# Patient Record
Sex: FEMALE | Race: WHITE | NOT HISPANIC OR LATINO | Employment: FULL TIME | URBAN - METROPOLITAN AREA
[De-identification: names, ages, dates, MRNs, and addresses within clinical notes are randomized per-mention and may not be internally consistent; named-entity substitution may affect disease eponyms.]

---

## 2023-03-23 LAB
ALANINE AMINOTRANSFERASE (SGPT) (U/L) IN SER/PLAS: 18 U/L (ref 7–45)
ALBUMIN (G/DL) IN SER/PLAS: 4.2 G/DL (ref 3.4–5)
ALKALINE PHOSPHATASE (U/L) IN SER/PLAS: 74 U/L (ref 33–136)
ANION GAP IN SER/PLAS: 15 MMOL/L (ref 10–20)
APPEARANCE, URINE: ABNORMAL
ASPARTATE AMINOTRANSFERASE (SGOT) (U/L) IN SER/PLAS: 14 U/L (ref 9–39)
BACTERIA, URINE: ABNORMAL /HPF
BILIRUBIN TOTAL (MG/DL) IN SER/PLAS: 1.1 MG/DL (ref 0–1.2)
BILIRUBIN, URINE: NEGATIVE
BLOOD, URINE: NEGATIVE
CALCIUM (MG/DL) IN SER/PLAS: 10.2 MG/DL (ref 8.6–10.6)
CARBON DIOXIDE, TOTAL (MMOL/L) IN SER/PLAS: 25 MMOL/L (ref 21–32)
CHLORIDE (MMOL/L) IN SER/PLAS: 102 MMOL/L (ref 98–107)
CHOLESTEROL (MG/DL) IN SER/PLAS: 225 MG/DL (ref 0–199)
CHOLESTEROL IN HDL (MG/DL) IN SER/PLAS: 42.9 MG/DL
CHOLESTEROL/HDL RATIO: 5.2
COLOR, URINE: YELLOW
CREATININE (MG/DL) IN SER/PLAS: 1.1 MG/DL (ref 0.5–1.05)
ERYTHROCYTE DISTRIBUTION WIDTH (RATIO) BY AUTOMATED COUNT: 13.2 % (ref 11.5–14.5)
ERYTHROCYTE MEAN CORPUSCULAR HEMOGLOBIN CONCENTRATION (G/DL) BY AUTOMATED: 30.9 G/DL (ref 32–36)
ERYTHROCYTE MEAN CORPUSCULAR VOLUME (FL) BY AUTOMATED COUNT: 90 FL (ref 80–100)
ERYTHROCYTES (10*6/UL) IN BLOOD BY AUTOMATED COUNT: 4.64 X10E12/L (ref 4–5.2)
ESTIMATED AVERAGE GLUCOSE FOR HBA1C: 151 MG/DL
GFR FEMALE: 56 ML/MIN/1.73M2
GLUCOSE (MG/DL) IN SER/PLAS: 154 MG/DL (ref 74–99)
GLUCOSE, URINE: NEGATIVE MG/DL
HEMATOCRIT (%) IN BLOOD BY AUTOMATED COUNT: 41.7 % (ref 36–46)
HEMOGLOBIN (G/DL) IN BLOOD: 12.9 G/DL (ref 12–16)
HEMOGLOBIN A1C/HEMOGLOBIN TOTAL IN BLOOD: 6.9 %
KETONES, URINE: NEGATIVE MG/DL
LDL: 130 MG/DL (ref 0–99)
LEUKOCYTE ESTERASE, URINE: ABNORMAL
LEUKOCYTES (10*3/UL) IN BLOOD BY AUTOMATED COUNT: 8.1 X10E9/L (ref 4.4–11.3)
MUCUS, URINE: ABNORMAL /LPF
NITRITE, URINE: NEGATIVE
NON HDL CHOLESTEROL: 182 MG/DL
NRBC (PER 100 WBCS) BY AUTOMATED COUNT: 0 /100 WBC (ref 0–0)
PH, URINE: 5 (ref 5–8)
PLATELETS (10*3/UL) IN BLOOD AUTOMATED COUNT: 303 X10E9/L (ref 150–450)
POTASSIUM (MMOL/L) IN SER/PLAS: 4.9 MMOL/L (ref 3.5–5.3)
PROTEIN TOTAL: 7.1 G/DL (ref 6.4–8.2)
PROTEIN, URINE: NEGATIVE MG/DL
RBC, URINE: ABNORMAL /HPF (ref 0–5)
SODIUM (MMOL/L) IN SER/PLAS: 137 MMOL/L (ref 136–145)
SPECIFIC GRAVITY, URINE: 1.02 (ref 1–1.03)
SQUAMOUS EPITHELIAL CELLS, URINE: 4 /HPF
TRIGLYCERIDE (MG/DL) IN SER/PLAS: 263 MG/DL (ref 0–149)
TROPONIN I, HIGH SENSITIVITY: 3 NG/L (ref 0–34)
UREA NITROGEN (MG/DL) IN SER/PLAS: 23 MG/DL (ref 6–23)
UROBILINOGEN, URINE: <2 MG/DL (ref 0–1.9)
VLDL: 53 MG/DL (ref 0–40)
WBC, URINE: 9 /HPF (ref 0–5)

## 2023-03-24 DIAGNOSIS — I10 HYPERTENSION, UNSPECIFIED TYPE: ICD-10-CM

## 2023-03-24 DIAGNOSIS — E78.5 HYPERLIPIDEMIA, UNSPECIFIED HYPERLIPIDEMIA TYPE: ICD-10-CM

## 2023-03-24 DIAGNOSIS — E13.69 OTHER SPECIFIED DIABETES MELLITUS WITH OTHER SPECIFIED COMPLICATION, UNSPECIFIED WHETHER LONG TERM INSULIN USE (MULTI): ICD-10-CM

## 2023-03-24 DIAGNOSIS — E03.9 HYPOTHYROIDISM, UNSPECIFIED TYPE: ICD-10-CM

## 2023-03-24 RX ORDER — SITAGLIPTIN AND METFORMIN HYDROCHLORIDE 1000; 50 MG/1; MG/1
1 TABLET, FILM COATED, EXTENDED RELEASE ORAL 2 TIMES DAILY
COMMUNITY
Start: 2022-03-25 | End: 2023-04-04 | Stop reason: SDUPTHER

## 2023-03-24 RX ORDER — LOSARTAN POTASSIUM 50 MG/1
1 TABLET ORAL DAILY
COMMUNITY
Start: 2022-03-09 | End: 2023-04-04 | Stop reason: SDUPTHER

## 2023-03-24 RX ORDER — AMLODIPINE AND BENAZEPRIL HYDROCHLORIDE 10; 40 MG/1; MG/1
1 CAPSULE ORAL DAILY
COMMUNITY
Start: 2016-10-03 | End: 2023-04-04 | Stop reason: SDUPTHER

## 2023-03-24 RX ORDER — LEVOTHYROXINE SODIUM 50 UG/1
1 TABLET ORAL DAILY
COMMUNITY
Start: 2012-06-22 | End: 2023-04-04 | Stop reason: SDUPTHER

## 2023-03-24 RX ORDER — ATORVASTATIN CALCIUM 10 MG/1
1 TABLET, FILM COATED ORAL DAILY
COMMUNITY
Start: 2022-08-30 | End: 2023-04-04 | Stop reason: SDUPTHER

## 2023-03-26 RX ORDER — SITAGLIPTIN AND METFORMIN HYDROCHLORIDE 1000; 50 MG/1; MG/1
1 TABLET, FILM COATED, EXTENDED RELEASE ORAL 2 TIMES DAILY
Qty: 20 TABLET | Refills: 0 | OUTPATIENT
Start: 2023-03-26 | End: 2023-04-05

## 2023-03-26 RX ORDER — LOSARTAN POTASSIUM 50 MG/1
50 TABLET ORAL DAILY
Qty: 10 TABLET | Refills: 0 | OUTPATIENT
Start: 2023-03-26 | End: 2023-04-05

## 2023-03-26 RX ORDER — LEVOTHYROXINE SODIUM 50 UG/1
50 TABLET ORAL DAILY
Qty: 10 TABLET | Refills: 0 | OUTPATIENT
Start: 2023-03-26 | End: 2023-04-05

## 2023-03-26 RX ORDER — AMLODIPINE AND BENAZEPRIL HYDROCHLORIDE 10; 40 MG/1; MG/1
1 CAPSULE ORAL DAILY
Qty: 10 CAPSULE | Refills: 0 | OUTPATIENT
Start: 2023-03-26 | End: 2023-04-05

## 2023-03-26 RX ORDER — ATORVASTATIN CALCIUM 10 MG/1
10 TABLET, FILM COATED ORAL DAILY
Qty: 10 TABLET | Refills: 0 | OUTPATIENT
Start: 2023-03-26 | End: 2023-04-05

## 2023-03-30 PROBLEM — R05.9 COUGH: Status: ACTIVE | Noted: 2023-03-30

## 2023-03-30 PROBLEM — E03.9 HYPOTHYROIDISM: Status: ACTIVE | Noted: 2023-03-30

## 2023-03-30 PROBLEM — H25.13 AGE-RELATED NUCLEAR CATARACT OF BOTH EYES: Status: ACTIVE | Noted: 2023-03-30

## 2023-03-30 PROBLEM — G47.00 INSOMNIA: Status: ACTIVE | Noted: 2023-03-30

## 2023-03-30 PROBLEM — M54.50 LOWER BACK PAIN: Status: ACTIVE | Noted: 2023-03-30

## 2023-03-30 PROBLEM — E55.9 VITAMIN D DEFICIENCY: Status: ACTIVE | Noted: 2023-03-30

## 2023-03-30 PROBLEM — H52.4 BILATERAL PRESBYOPIA: Status: ACTIVE | Noted: 2023-03-30

## 2023-03-30 PROBLEM — E78.5 HYPERLIPIDEMIA: Status: ACTIVE | Noted: 2023-03-30

## 2023-03-30 PROBLEM — I10 HYPERTENSION: Status: ACTIVE | Noted: 2023-03-30

## 2023-03-30 PROBLEM — H52.31 ANISOMETROPIA: Status: ACTIVE | Noted: 2023-03-30

## 2023-03-30 PROBLEM — N39.0 UTI (URINARY TRACT INFECTION): Status: ACTIVE | Noted: 2023-03-30

## 2023-03-30 PROBLEM — R53.83 FATIGUE: Status: ACTIVE | Noted: 2023-03-30

## 2023-03-30 PROBLEM — E11.9 DIABETES MELLITUS (MULTI): Status: ACTIVE | Noted: 2023-03-30

## 2023-03-30 PROBLEM — J01.90 ACUTE SINUSITIS: Status: ACTIVE | Noted: 2023-03-30

## 2023-03-30 PROBLEM — M25.569 JOINT PAIN, KNEE: Status: ACTIVE | Noted: 2023-03-30

## 2023-03-30 RX ORDER — ACETAMINOPHEN 500 MG
1 TABLET ORAL DAILY
COMMUNITY
Start: 2022-03-09 | End: 2023-04-04 | Stop reason: SDUPTHER

## 2023-04-04 ENCOUNTER — OFFICE VISIT (OUTPATIENT)
Dept: PRIMARY CARE | Facility: CLINIC | Age: 63
End: 2023-04-04
Payer: COMMERCIAL

## 2023-04-04 VITALS
WEIGHT: 293 LBS | BODY MASS INDEX: 45.99 KG/M2 | HEIGHT: 67 IN | DIASTOLIC BLOOD PRESSURE: 84 MMHG | SYSTOLIC BLOOD PRESSURE: 142 MMHG

## 2023-04-04 DIAGNOSIS — E78.5 HYPERLIPIDEMIA, UNSPECIFIED HYPERLIPIDEMIA TYPE: ICD-10-CM

## 2023-04-04 DIAGNOSIS — E13.9 OTHER SPECIFIED DIABETES MELLITUS WITHOUT COMPLICATION, WITHOUT LONG-TERM CURRENT USE OF INSULIN (MULTI): ICD-10-CM

## 2023-04-04 DIAGNOSIS — N39.0 URINARY TRACT INFECTION WITHOUT HEMATURIA, SITE UNSPECIFIED: ICD-10-CM

## 2023-04-04 DIAGNOSIS — I10 HYPERTENSION, UNSPECIFIED TYPE: ICD-10-CM

## 2023-04-04 DIAGNOSIS — E13.9 OTHER SPECIFIED DIABETES MELLITUS WITHOUT COMPLICATION, WITHOUT LONG-TERM CURRENT USE OF INSULIN (MULTI): Primary | ICD-10-CM

## 2023-04-04 DIAGNOSIS — E03.9 HYPOTHYROIDISM, UNSPECIFIED TYPE: ICD-10-CM

## 2023-04-04 DIAGNOSIS — K80.63 GALLBLADDER & BILE DUCT STONE, ACUTE CHOLECYSTITIS AND OBSTRUCTION: Primary | ICD-10-CM

## 2023-04-04 PROCEDURE — 3077F SYST BP >= 140 MM HG: CPT | Performed by: INTERNAL MEDICINE

## 2023-04-04 PROCEDURE — 3044F HG A1C LEVEL LT 7.0%: CPT | Performed by: INTERNAL MEDICINE

## 2023-04-04 PROCEDURE — 3079F DIAST BP 80-89 MM HG: CPT | Performed by: INTERNAL MEDICINE

## 2023-04-04 PROCEDURE — 4010F ACE/ARB THERAPY RXD/TAKEN: CPT | Performed by: INTERNAL MEDICINE

## 2023-04-04 PROCEDURE — 99213 OFFICE O/P EST LOW 20 MIN: CPT | Performed by: INTERNAL MEDICINE

## 2023-04-04 RX ORDER — SITAGLIPTIN AND METFORMIN HYDROCHLORIDE 1000; 50 MG/1; MG/1
1 TABLET, FILM COATED, EXTENDED RELEASE ORAL 2 TIMES DAILY
Qty: 30 TABLET | Refills: 0 | Status: SHIPPED | OUTPATIENT
Start: 2023-04-04 | End: 2023-04-06 | Stop reason: SDUPTHER

## 2023-04-04 RX ORDER — CIPROFLOXACIN 500 MG/1
500 TABLET ORAL 2 TIMES DAILY
Qty: 20 TABLET | Refills: 0 | Status: SHIPPED | OUTPATIENT
Start: 2023-04-04 | End: 2023-04-04 | Stop reason: SDUPTHER

## 2023-04-04 RX ORDER — AMLODIPINE AND BENAZEPRIL HYDROCHLORIDE 10; 40 MG/1; MG/1
1 CAPSULE ORAL DAILY
Qty: 60 CAPSULE | Refills: 0 | Status: SHIPPED | OUTPATIENT
Start: 2023-04-04 | End: 2023-04-06 | Stop reason: SDUPTHER

## 2023-04-04 RX ORDER — CIPROFLOXACIN 500 MG/1
500 TABLET ORAL 2 TIMES DAILY
Qty: 20 TABLET | Refills: 0 | Status: SHIPPED | OUTPATIENT
Start: 2023-04-04 | End: 2023-04-06 | Stop reason: SDUPTHER

## 2023-04-04 RX ORDER — LEVOTHYROXINE SODIUM 50 UG/1
50 TABLET ORAL DAILY
Qty: 90 TABLET | Refills: 1 | Status: SHIPPED | OUTPATIENT
Start: 2023-04-04 | End: 2023-04-04 | Stop reason: SDUPTHER

## 2023-04-04 RX ORDER — ATORVASTATIN CALCIUM 10 MG/1
10 TABLET, FILM COATED ORAL DAILY
Qty: 90 TABLET | Refills: 1 | Status: SHIPPED | OUTPATIENT
Start: 2023-04-04 | End: 2023-04-04 | Stop reason: SDUPTHER

## 2023-04-04 RX ORDER — LOSARTAN POTASSIUM 50 MG/1
50 TABLET ORAL DAILY
Qty: 60 TABLET | Refills: 0 | Status: SHIPPED | OUTPATIENT
Start: 2023-04-04 | End: 2023-04-06 | Stop reason: SDUPTHER

## 2023-04-04 RX ORDER — LEVOTHYROXINE SODIUM 50 UG/1
50 TABLET ORAL DAILY
Qty: 30 TABLET | Refills: 0 | Status: SHIPPED | OUTPATIENT
Start: 2023-04-04 | End: 2023-04-06 | Stop reason: SDUPTHER

## 2023-04-04 RX ORDER — SITAGLIPTIN AND METFORMIN HYDROCHLORIDE 1000; 50 MG/1; MG/1
1 TABLET, FILM COATED, EXTENDED RELEASE ORAL 2 TIMES DAILY
Qty: 90 TABLET | Refills: 1 | Status: SHIPPED | OUTPATIENT
Start: 2023-04-04 | End: 2023-04-04 | Stop reason: SDUPTHER

## 2023-04-04 RX ORDER — LEVOTHYROXINE SODIUM 50 UG/1
50 TABLET ORAL DAILY
Qty: 30 TABLET | Refills: 0 | Status: SHIPPED | OUTPATIENT
Start: 2023-04-04 | End: 2023-04-04 | Stop reason: SDUPTHER

## 2023-04-04 RX ORDER — AMLODIPINE AND BENAZEPRIL HYDROCHLORIDE 10; 40 MG/1; MG/1
1 CAPSULE ORAL DAILY
Qty: 60 CAPSULE | Refills: 0 | Status: SHIPPED | OUTPATIENT
Start: 2023-04-04 | End: 2023-04-04 | Stop reason: SDUPTHER

## 2023-04-04 RX ORDER — ATORVASTATIN CALCIUM 10 MG/1
10 TABLET, FILM COATED ORAL DAILY
Qty: 30 TABLET | Refills: 0 | Status: SHIPPED | OUTPATIENT
Start: 2023-04-04 | End: 2023-04-04 | Stop reason: SDUPTHER

## 2023-04-04 RX ORDER — LOSARTAN POTASSIUM 50 MG/1
50 TABLET ORAL DAILY
Qty: 60 TABLET | Refills: 0 | Status: SHIPPED | OUTPATIENT
Start: 2023-04-04 | End: 2023-04-04 | Stop reason: SDUPTHER

## 2023-04-04 RX ORDER — SITAGLIPTIN AND METFORMIN HYDROCHLORIDE 1000; 50 MG/1; MG/1
1 TABLET, FILM COATED, EXTENDED RELEASE ORAL 2 TIMES DAILY
Qty: 30 TABLET | Refills: 0 | Status: SHIPPED | OUTPATIENT
Start: 2023-04-04 | End: 2023-04-04 | Stop reason: SDUPTHER

## 2023-04-04 RX ORDER — LOSARTAN POTASSIUM 50 MG/1
50 TABLET ORAL DAILY
Qty: 90 TABLET | Refills: 1 | Status: SHIPPED | OUTPATIENT
Start: 2023-04-04 | End: 2023-04-04 | Stop reason: SDUPTHER

## 2023-04-04 RX ORDER — ACETAMINOPHEN 500 MG
5000 TABLET ORAL DAILY
Qty: 90 TABLET | Refills: 0 | Status: SHIPPED | OUTPATIENT
Start: 2023-04-04

## 2023-04-04 RX ORDER — AMLODIPINE AND BENAZEPRIL HYDROCHLORIDE 10; 40 MG/1; MG/1
1 CAPSULE ORAL DAILY
Qty: 90 CAPSULE | Refills: 1 | Status: SHIPPED | OUTPATIENT
Start: 2023-04-04 | End: 2023-04-04 | Stop reason: SDUPTHER

## 2023-04-04 RX ORDER — ATORVASTATIN CALCIUM 10 MG/1
10 TABLET, FILM COATED ORAL DAILY
Qty: 30 TABLET | Refills: 0 | Status: SHIPPED | OUTPATIENT
Start: 2023-04-04 | End: 2023-04-06 | Stop reason: SDUPTHER

## 2023-04-04 NOTE — PROGRESS NOTES
OFFICE NOTE    NAME OF THE PATIENT: Britt Vega    YOB: 1960    CHIEF COMPLAINT:  Ms. Shahid Whitley today came here for multiple medical issues.  She is very unhappy because her blood pressure medication not called in and she is without the blood pressure medicine.  She came here for follow-up on various conditions.  Increased frequency of urine, UTI symptoms.  She came here for follow-up on various conditions.    PAST MEDICAL HISTORY:  Reviewed on EMR, unchanged.    CURRENT MEDICATIONS:  Reviewed on EMR, unchanged.  List reviewed.    ALLERGIES:  Reviewed on EMR, unchanged.    SOCIAL HISTORY:  Reviewed on EMR, unchanged.  She does not smoke and does not drink alcohol.     FAMILY HISTORY:  Reviewed on EMR, unchanged.    REVIEW OF SYSTEMS:  All 12 systems reviewed and pertaining covered in history and physical.    PHYSICAL EXAMINATION  VITAL SIGNS:  As recorded and reviewed from EMR.  RESPIRATORY:  The patient had normal inspirations and expirations.  The breath sounds were equal bilaterally and clear to auscultation.  CARDIOVASCULAR:  The patient had S1 normal, split S2 without obvious rubs, clicks, or murmurs.    GASTROINTESTINAL:  There was no hepatosplenomegaly.  There were no palpable masses and no inguinal nodes.  EXTREMITIES:  Legs had no edema.  NEUROLOGIC:  The patient had normal cranial nerves.  The reflexes, sensory, and motor examination were grossly within normal limits.    LAB WORK:  Laboratory testing discussed.    ASSESSMENT AND PLAN:  Increased frequency of urine, UTI.  I gave her Cipro, it should work.  Type 2 diabetes.  Hemoglobin A1c is very nice.  Hypertension, excellent.  Cholesterol, excellent.  Weight.  She is keeping it under control.  Gynecological, okay.  Follow up in three to four months.  I urged her to call me in any time if necessary.  Continue medication.      Kindly review this note in conjunction with EMR.   Subjective   Patient ID: Britt Whitley is a 62 y.o.  "female who presents for Follow-up.      HPI    Review of Systems    Objective   /84   Ht 1.702 m (5' 7\")   Wt (!) 149 kg (329 lb)   BMI 51.53 kg/m²       Physical Exam    Assessment/Plan   Problem List Items Addressed This Visit          Circulatory    Hypertension    Relevant Medications    amLODIPine-benazepriL (Lotrel) 10-40 mg capsule    losartan (Cozaar) 50 mg tablet       Endocrine/Metabolic    Diabetes mellitus (CMS/HCC)    Relevant Medications    SITagliptin phos-metformin (Janumet XR) 50-1,000 mg tablet, ER multiphase 24 hr    Hypothyroidism    Relevant Medications    levothyroxine (Synthroid, Levoxyl) 50 mcg tablet       Other    Hyperlipidemia    Relevant Medications    atorvastatin (Lipitor) 10 mg tablet         "

## 2023-04-06 RX ORDER — AMLODIPINE AND BENAZEPRIL HYDROCHLORIDE 10; 40 MG/1; MG/1
1 CAPSULE ORAL DAILY
Qty: 60 CAPSULE | Refills: 0 | Status: SHIPPED | OUTPATIENT
Start: 2023-04-06 | End: 2023-04-13 | Stop reason: SDUPTHER

## 2023-04-06 RX ORDER — LOSARTAN POTASSIUM 50 MG/1
50 TABLET ORAL DAILY
Qty: 60 TABLET | Refills: 0 | Status: SHIPPED | OUTPATIENT
Start: 2023-04-06 | End: 2023-04-13 | Stop reason: SDUPTHER

## 2023-04-06 RX ORDER — LEVOTHYROXINE SODIUM 50 UG/1
50 TABLET ORAL DAILY
Qty: 30 TABLET | Refills: 0 | Status: SHIPPED | OUTPATIENT
Start: 2023-04-06 | End: 2023-04-13 | Stop reason: SDUPTHER

## 2023-04-06 RX ORDER — CIPROFLOXACIN 500 MG/1
500 TABLET ORAL 2 TIMES DAILY
Qty: 20 TABLET | Refills: 0 | Status: SHIPPED | OUTPATIENT
Start: 2023-04-06 | End: 2023-04-16

## 2023-04-06 RX ORDER — SITAGLIPTIN AND METFORMIN HYDROCHLORIDE 1000; 50 MG/1; MG/1
1 TABLET, FILM COATED, EXTENDED RELEASE ORAL 2 TIMES DAILY
Qty: 60 TABLET | Refills: 0 | Status: SHIPPED | OUTPATIENT
Start: 2023-04-06 | End: 2023-04-13 | Stop reason: SDUPTHER

## 2023-04-06 RX ORDER — ATORVASTATIN CALCIUM 10 MG/1
10 TABLET, FILM COATED ORAL DAILY
Qty: 30 TABLET | Refills: 0 | Status: SHIPPED | OUTPATIENT
Start: 2023-04-06 | End: 2023-04-13 | Stop reason: SDUPTHER

## 2023-04-13 DIAGNOSIS — E13.9 OTHER SPECIFIED DIABETES MELLITUS WITHOUT COMPLICATION, WITHOUT LONG-TERM CURRENT USE OF INSULIN (MULTI): ICD-10-CM

## 2023-04-13 DIAGNOSIS — I10 HYPERTENSION, UNSPECIFIED TYPE: ICD-10-CM

## 2023-04-13 DIAGNOSIS — E03.9 HYPOTHYROIDISM, UNSPECIFIED TYPE: ICD-10-CM

## 2023-04-13 DIAGNOSIS — E78.5 HYPERLIPIDEMIA, UNSPECIFIED HYPERLIPIDEMIA TYPE: ICD-10-CM

## 2023-04-13 RX ORDER — LOSARTAN POTASSIUM 50 MG/1
50 TABLET ORAL DAILY
Qty: 60 TABLET | Refills: 0 | Status: SHIPPED | OUTPATIENT
Start: 2023-04-13 | End: 2023-06-04

## 2023-04-13 RX ORDER — LEVOTHYROXINE SODIUM 50 UG/1
50 TABLET ORAL DAILY
Qty: 30 TABLET | Refills: 0 | Status: SHIPPED | OUTPATIENT
Start: 2023-04-13 | End: 2023-05-12

## 2023-04-13 RX ORDER — ATORVASTATIN CALCIUM 10 MG/1
10 TABLET, FILM COATED ORAL DAILY
Qty: 30 TABLET | Refills: 0 | Status: SHIPPED | OUTPATIENT
Start: 2023-04-13 | End: 2023-05-12

## 2023-04-13 RX ORDER — AMLODIPINE AND BENAZEPRIL HYDROCHLORIDE 10; 40 MG/1; MG/1
1 CAPSULE ORAL DAILY
Qty: 60 CAPSULE | Refills: 0 | Status: SHIPPED | OUTPATIENT
Start: 2023-04-13 | End: 2023-07-19

## 2023-04-13 RX ORDER — SITAGLIPTIN AND METFORMIN HYDROCHLORIDE 1000; 50 MG/1; MG/1
1 TABLET, FILM COATED, EXTENDED RELEASE ORAL 2 TIMES DAILY
Qty: 180 TABLET | Refills: 1 | Status: SHIPPED | OUTPATIENT
Start: 2023-04-13 | End: 2023-10-11

## 2023-05-12 DIAGNOSIS — E78.5 HYPERLIPIDEMIA, UNSPECIFIED HYPERLIPIDEMIA TYPE: ICD-10-CM

## 2023-05-12 DIAGNOSIS — E03.9 HYPOTHYROIDISM, UNSPECIFIED TYPE: ICD-10-CM

## 2023-05-12 RX ORDER — LEVOTHYROXINE SODIUM 50 UG/1
TABLET ORAL
Qty: 30 TABLET | Refills: 11 | Status: SHIPPED | OUTPATIENT
Start: 2023-05-12 | End: 2023-05-18 | Stop reason: SDUPTHER

## 2023-05-12 RX ORDER — ATORVASTATIN CALCIUM 10 MG/1
TABLET, FILM COATED ORAL
Qty: 30 TABLET | Refills: 11 | Status: SHIPPED | OUTPATIENT
Start: 2023-05-12 | End: 2023-05-18 | Stop reason: SDUPTHER

## 2023-05-18 DIAGNOSIS — E78.5 HYPERLIPIDEMIA, UNSPECIFIED HYPERLIPIDEMIA TYPE: ICD-10-CM

## 2023-05-18 DIAGNOSIS — E03.9 HYPOTHYROIDISM, UNSPECIFIED TYPE: ICD-10-CM

## 2023-05-18 RX ORDER — LEVOTHYROXINE SODIUM 50 UG/1
TABLET ORAL
Qty: 90 TABLET | Refills: 0 | Status: SHIPPED | OUTPATIENT
Start: 2023-05-18 | End: 2023-09-08

## 2023-05-18 RX ORDER — ATORVASTATIN CALCIUM 10 MG/1
TABLET, FILM COATED ORAL
Qty: 90 TABLET | Refills: 0 | Status: SHIPPED | OUTPATIENT
Start: 2023-05-18 | End: 2023-09-08

## 2023-07-31 DIAGNOSIS — I10 HYPERTENSION, UNSPECIFIED TYPE: ICD-10-CM

## 2023-07-31 RX ORDER — LOSARTAN POTASSIUM 50 MG/1
TABLET ORAL
Qty: 60 TABLET | Refills: 5 | Status: SHIPPED | OUTPATIENT
Start: 2023-07-31

## 2023-09-08 DIAGNOSIS — E78.5 HYPERLIPIDEMIA, UNSPECIFIED HYPERLIPIDEMIA TYPE: ICD-10-CM

## 2023-09-08 DIAGNOSIS — E03.9 HYPOTHYROIDISM, UNSPECIFIED TYPE: ICD-10-CM

## 2023-09-08 RX ORDER — LEVOTHYROXINE SODIUM 50 UG/1
TABLET ORAL
Qty: 90 TABLET | Refills: 3 | Status: SHIPPED | OUTPATIENT
Start: 2023-09-08

## 2023-09-08 RX ORDER — ATORVASTATIN CALCIUM 10 MG/1
TABLET, FILM COATED ORAL
Qty: 90 TABLET | Refills: 3 | Status: SHIPPED | OUTPATIENT
Start: 2023-09-08

## 2023-09-25 DIAGNOSIS — I10 HYPERTENSION, UNSPECIFIED TYPE: ICD-10-CM

## 2023-09-25 RX ORDER — AMLODIPINE AND BENAZEPRIL HYDROCHLORIDE 10; 40 MG/1; MG/1
CAPSULE ORAL
Qty: 60 CAPSULE | Refills: 5 | Status: SHIPPED | OUTPATIENT
Start: 2023-09-25

## 2024-01-09 DIAGNOSIS — E13.9 OTHER SPECIFIED DIABETES MELLITUS WITHOUT COMPLICATION, WITHOUT LONG-TERM CURRENT USE OF INSULIN (MULTI): ICD-10-CM

## 2024-01-09 RX ORDER — SITAGLIPTIN AND METFORMIN HYDROCHLORIDE 1000; 50 MG/1; MG/1
TABLET, FILM COATED, EXTENDED RELEASE ORAL
Qty: 180 TABLET | Refills: 3 | OUTPATIENT
Start: 2024-01-09

## 2024-07-16 DIAGNOSIS — I10 HYPERTENSION, UNSPECIFIED TYPE: ICD-10-CM

## 2024-07-16 RX ORDER — LOSARTAN POTASSIUM 50 MG/1
TABLET ORAL
Qty: 60 TABLET | Refills: 5 | OUTPATIENT
Start: 2024-07-16

## 2024-07-18 ENCOUNTER — TELEPHONE (OUTPATIENT)
Dept: PRIMARY CARE | Facility: CLINIC | Age: 64
End: 2024-07-18
Payer: COMMERCIAL

## 2024-07-18 DIAGNOSIS — E78.5 HYPERLIPIDEMIA, UNSPECIFIED HYPERLIPIDEMIA TYPE: ICD-10-CM

## 2024-07-18 DIAGNOSIS — E55.9 VITAMIN D DEFICIENCY: ICD-10-CM

## 2024-07-18 DIAGNOSIS — E13.9 OTHER SPECIFIED DIABETES MELLITUS WITHOUT COMPLICATION, WITHOUT LONG-TERM CURRENT USE OF INSULIN (MULTI): ICD-10-CM

## 2024-07-18 DIAGNOSIS — I10 HYPERTENSION, UNSPECIFIED TYPE: ICD-10-CM

## 2024-07-18 DIAGNOSIS — E03.9 HYPOTHYROIDISM, UNSPECIFIED TYPE: ICD-10-CM

## 2024-08-15 ENCOUNTER — LAB (OUTPATIENT)
Dept: LAB | Facility: LAB | Age: 64
End: 2024-08-15
Payer: COMMERCIAL

## 2024-08-15 DIAGNOSIS — E13.9 OTHER SPECIFIED DIABETES MELLITUS WITHOUT COMPLICATION, WITHOUT LONG-TERM CURRENT USE OF INSULIN (MULTI): ICD-10-CM

## 2024-08-15 DIAGNOSIS — E03.9 HYPOTHYROIDISM, UNSPECIFIED TYPE: ICD-10-CM

## 2024-08-15 DIAGNOSIS — E78.5 HYPERLIPIDEMIA, UNSPECIFIED HYPERLIPIDEMIA TYPE: ICD-10-CM

## 2024-08-15 DIAGNOSIS — E55.9 VITAMIN D DEFICIENCY: ICD-10-CM

## 2024-08-15 DIAGNOSIS — I10 HYPERTENSION, UNSPECIFIED TYPE: ICD-10-CM

## 2024-08-15 LAB
25(OH)D3 SERPL-MCNC: 35 NG/ML (ref 30–100)
ALBUMIN SERPL BCP-MCNC: 4.1 G/DL (ref 3.4–5)
ALP SERPL-CCNC: 69 U/L (ref 33–136)
ALT SERPL W P-5'-P-CCNC: 17 U/L (ref 7–45)
ANION GAP SERPL CALC-SCNC: 18 MMOL/L (ref 10–20)
APPEARANCE UR: CLEAR
AST SERPL W P-5'-P-CCNC: 15 U/L (ref 9–39)
BILIRUB SERPL-MCNC: 0.9 MG/DL (ref 0–1.2)
BILIRUB UR STRIP.AUTO-MCNC: NEGATIVE MG/DL
BUN SERPL-MCNC: 26 MG/DL (ref 6–23)
CALCIUM SERPL-MCNC: 9.7 MG/DL (ref 8.6–10.6)
CHLORIDE SERPL-SCNC: 105 MMOL/L (ref 98–107)
CHOLEST SERPL-MCNC: 146 MG/DL (ref 0–199)
CHOLESTEROL/HDL RATIO: 3.6
CO2 SERPL-SCNC: 21 MMOL/L (ref 21–32)
COLOR UR: ABNORMAL
CREAT SERPL-MCNC: 1.13 MG/DL (ref 0.5–1.05)
EGFRCR SERPLBLD CKD-EPI 2021: 54 ML/MIN/1.73M*2
ERYTHROCYTE [DISTWIDTH] IN BLOOD BY AUTOMATED COUNT: 13.8 % (ref 11.5–14.5)
EST. AVERAGE GLUCOSE BLD GHB EST-MCNC: 154 MG/DL
GLUCOSE SERPL-MCNC: 155 MG/DL (ref 74–99)
GLUCOSE UR STRIP.AUTO-MCNC: NORMAL MG/DL
GRAN CASTS #/AREA UR COMP ASSIST: ABNORMAL /LPF
HBA1C MFR BLD: 7 %
HCT VFR BLD AUTO: 39.1 % (ref 36–46)
HDLC SERPL-MCNC: 40.4 MG/DL
HGB BLD-MCNC: 12.5 G/DL (ref 12–16)
HYALINE CASTS #/AREA URNS AUTO: ABNORMAL /LPF
KETONES UR STRIP.AUTO-MCNC: NEGATIVE MG/DL
LDLC SERPL CALC-MCNC: 53 MG/DL
LEUKOCYTE ESTERASE UR QL STRIP.AUTO: ABNORMAL
MCH RBC QN AUTO: 29.3 PG (ref 26–34)
MCHC RBC AUTO-ENTMCNC: 32 G/DL (ref 32–36)
MCV RBC AUTO: 92 FL (ref 80–100)
MIXED CELL CASTS #/AREA UR COMP ASSIST: ABNORMAL /LPF
MUCOUS THREADS #/AREA URNS AUTO: ABNORMAL /LPF
NITRITE UR QL STRIP.AUTO: NEGATIVE
NON HDL CHOLESTEROL: 106 MG/DL (ref 0–149)
NRBC BLD-RTO: 0 /100 WBCS (ref 0–0)
PH UR STRIP.AUTO: 5 [PH]
PLATELET # BLD AUTO: 300 X10*3/UL (ref 150–450)
POTASSIUM SERPL-SCNC: 5.1 MMOL/L (ref 3.5–5.3)
PROT SERPL-MCNC: 7 G/DL (ref 6.4–8.2)
PROT UR STRIP.AUTO-MCNC: NEGATIVE MG/DL
RBC # BLD AUTO: 4.27 X10*6/UL (ref 4–5.2)
RBC # UR STRIP.AUTO: NEGATIVE /UL
RBC #/AREA URNS AUTO: ABNORMAL /HPF
SODIUM SERPL-SCNC: 139 MMOL/L (ref 136–145)
SP GR UR STRIP.AUTO: 1.02
SQUAMOUS #/AREA URNS AUTO: ABNORMAL /HPF
TRIGL SERPL-MCNC: 261 MG/DL (ref 0–149)
TSH SERPL-ACNC: 3.64 MIU/L (ref 0.44–3.98)
UROBILINOGEN UR STRIP.AUTO-MCNC: NORMAL MG/DL
VIT B12 SERPL-MCNC: 367 PG/ML (ref 211–911)
VLDL: 52 MG/DL (ref 0–40)
WBC # BLD AUTO: 8.8 X10*3/UL (ref 4.4–11.3)
WBC #/AREA URNS AUTO: ABNORMAL /HPF

## 2024-08-15 PROCEDURE — 85027 COMPLETE CBC AUTOMATED: CPT

## 2024-08-15 PROCEDURE — 82306 VITAMIN D 25 HYDROXY: CPT

## 2024-08-15 PROCEDURE — 83036 HEMOGLOBIN GLYCOSYLATED A1C: CPT

## 2024-08-15 PROCEDURE — 84443 ASSAY THYROID STIM HORMONE: CPT

## 2024-08-15 PROCEDURE — 36415 COLL VENOUS BLD VENIPUNCTURE: CPT

## 2024-08-15 PROCEDURE — 80053 COMPREHEN METABOLIC PANEL: CPT

## 2024-08-15 PROCEDURE — 81001 URINALYSIS AUTO W/SCOPE: CPT

## 2024-08-15 PROCEDURE — 80061 LIPID PANEL: CPT

## 2024-08-15 PROCEDURE — 82607 VITAMIN B-12: CPT

## 2024-08-15 RX ORDER — LEVOTHYROXINE SODIUM 50 UG/1
TABLET ORAL
Qty: 90 TABLET | Refills: 3 | OUTPATIENT
Start: 2024-08-15

## 2024-08-15 RX ORDER — ATORVASTATIN CALCIUM 10 MG/1
TABLET, FILM COATED ORAL
Qty: 90 TABLET | Refills: 3 | OUTPATIENT
Start: 2024-08-15

## 2024-08-19 ASSESSMENT — ENCOUNTER SYMPTOMS
HEADACHES: 0
NECK PAIN: 0
ORTHOPNEA: 0
PND: 0
PALPITATIONS: 0
SHORTNESS OF BREATH: 0
SWEATS: 0
HYPERTENSION: 1
BLURRED VISION: 0

## 2024-08-20 ENCOUNTER — APPOINTMENT (OUTPATIENT)
Dept: PRIMARY CARE | Facility: CLINIC | Age: 64
End: 2024-08-20
Payer: COMMERCIAL

## 2024-08-20 VITALS
WEIGHT: 293 LBS | SYSTOLIC BLOOD PRESSURE: 132 MMHG | DIASTOLIC BLOOD PRESSURE: 74 MMHG | BODY MASS INDEX: 45.99 KG/M2 | HEIGHT: 67 IN

## 2024-08-20 DIAGNOSIS — Z12.31 VISIT FOR SCREENING MAMMOGRAM: Primary | ICD-10-CM

## 2024-08-20 DIAGNOSIS — E11.22 TYPE 2 DIABETES MELLITUS WITH STAGE 2 CHRONIC KIDNEY DISEASE, UNSPECIFIED WHETHER LONG TERM INSULIN USE (MULTI): ICD-10-CM

## 2024-08-20 DIAGNOSIS — E13.9 OTHER SPECIFIED DIABETES MELLITUS WITHOUT COMPLICATION, WITHOUT LONG-TERM CURRENT USE OF INSULIN (MULTI): ICD-10-CM

## 2024-08-20 DIAGNOSIS — N39.0 URINARY TRACT INFECTION WITHOUT HEMATURIA, SITE UNSPECIFIED: ICD-10-CM

## 2024-08-20 DIAGNOSIS — E78.5 HYPERLIPIDEMIA, UNSPECIFIED HYPERLIPIDEMIA TYPE: ICD-10-CM

## 2024-08-20 DIAGNOSIS — I10 HYPERTENSION, UNSPECIFIED TYPE: ICD-10-CM

## 2024-08-20 DIAGNOSIS — E03.9 HYPOTHYROIDISM, UNSPECIFIED TYPE: ICD-10-CM

## 2024-08-20 DIAGNOSIS — E11.8 DIABETIC FEET (MULTI): ICD-10-CM

## 2024-08-20 DIAGNOSIS — N18.2 TYPE 2 DIABETES MELLITUS WITH STAGE 2 CHRONIC KIDNEY DISEASE, UNSPECIFIED WHETHER LONG TERM INSULIN USE (MULTI): ICD-10-CM

## 2024-08-20 PROCEDURE — 3075F SYST BP GE 130 - 139MM HG: CPT | Performed by: INTERNAL MEDICINE

## 2024-08-20 PROCEDURE — 3078F DIAST BP <80 MM HG: CPT | Performed by: INTERNAL MEDICINE

## 2024-08-20 PROCEDURE — 3051F HG A1C>EQUAL 7.0%<8.0%: CPT | Performed by: INTERNAL MEDICINE

## 2024-08-20 PROCEDURE — 99214 OFFICE O/P EST MOD 30 MIN: CPT | Performed by: INTERNAL MEDICINE

## 2024-08-20 PROCEDURE — 3008F BODY MASS INDEX DOCD: CPT | Performed by: INTERNAL MEDICINE

## 2024-08-20 PROCEDURE — 4010F ACE/ARB THERAPY RXD/TAKEN: CPT | Performed by: INTERNAL MEDICINE

## 2024-08-20 PROCEDURE — 1159F MED LIST DOCD IN RCRD: CPT | Performed by: INTERNAL MEDICINE

## 2024-08-20 PROCEDURE — 3048F LDL-C <100 MG/DL: CPT | Performed by: INTERNAL MEDICINE

## 2024-08-20 RX ORDER — CIPROFLOXACIN 500 MG/1
500 TABLET ORAL 2 TIMES DAILY
Qty: 20 TABLET | Refills: 0 | Status: SHIPPED | OUTPATIENT
Start: 2024-08-20 | End: 2024-08-30

## 2024-08-20 RX ORDER — TIRZEPATIDE 2.5 MG/.5ML
2.5 INJECTION, SOLUTION SUBCUTANEOUS
Qty: 2 ML | Refills: 0 | Status: SHIPPED | OUTPATIENT
Start: 2024-08-25

## 2024-08-20 RX ORDER — LEVOTHYROXINE SODIUM 50 UG/1
TABLET ORAL
Qty: 30 TABLET | Refills: 0 | Status: SHIPPED | OUTPATIENT
Start: 2024-08-20

## 2024-08-20 RX ORDER — LOSARTAN POTASSIUM 50 MG/1
50 TABLET ORAL DAILY
Qty: 90 TABLET | Refills: 0 | Status: SHIPPED | OUTPATIENT
Start: 2024-08-20

## 2024-08-20 RX ORDER — ATORVASTATIN CALCIUM 10 MG/1
TABLET, FILM COATED ORAL
Qty: 90 TABLET | Refills: 3 | Status: SHIPPED | OUTPATIENT
Start: 2024-08-20

## 2024-08-20 RX ORDER — AMLODIPINE AND BENAZEPRIL HYDROCHLORIDE 10; 40 MG/1; MG/1
1 CAPSULE ORAL DAILY
Qty: 90 CAPSULE | Refills: 0 | Status: SHIPPED | OUTPATIENT
Start: 2024-08-20

## 2024-08-20 RX ORDER — METFORMIN HYDROCHLORIDE 1000 MG/1
1000 TABLET ORAL
Qty: 30 TABLET | Refills: 0 | Status: SHIPPED | OUTPATIENT
Start: 2024-08-20

## 2024-08-20 ASSESSMENT — ENCOUNTER SYMPTOMS
PALPITATIONS: 0
PND: 0
OCCASIONAL FEELINGS OF UNSTEADINESS: 0
SHORTNESS OF BREATH: 0
HYPERTENSION: 1
LOSS OF SENSATION IN FEET: 0
ORTHOPNEA: 0
BLURRED VISION: 0
DEPRESSION: 0
NECK PAIN: 0
SWEATS: 0
HEADACHES: 0

## 2024-08-21 NOTE — PROGRESS NOTES
"Subjective   Patient ID: Isabel Whitley is a 63 y.o. female who presents for multiple medical issues..    Ms. Whitley today came here for multiple medical issues.  Overall, she is a happy person.  Appetite and weight are okay.  No chest pain.  Taking medications regularly.  She has had a blood work done.  She is very meticulous, taking medications.  Now she decided that she wants to lose weight.  She is ______(waiting?) for that.  She came here for follow-up.    I have personally reviewed the patient's Past Medical History, Medications, Allergies, Social History, and Family History in the EMR.    Hypertension  This is a chronic problem. The current episode started more than 1 year ago. The problem has been waxing and waning since onset. The problem is controlled. Associated symptoms include malaise/fatigue. Pertinent negatives include no anxiety, blurred vision, chest pain, headaches, neck pain, orthopnea, palpitations, peripheral edema, PND, shortness of breath or sweats. Agents associated with hypertension include NSAIDs and thyroid hormones. Risk factors for coronary artery disease include diabetes mellitus, dyslipidemia and obesity. Compliance problems include exercise.       Review of Systems   Constitutional:  Positive for malaise/fatigue.   Eyes:  Negative for blurred vision.   Respiratory:  Negative for shortness of breath.    Cardiovascular:  Negative for chest pain, palpitations, orthopnea and PND.   Musculoskeletal:  Negative for neck pain.   Neurological:  Negative for headaches.   All other systems reviewed and are negative.    Objective   /74   Ht 1.702 m (5' 7\")   Wt (!) 156 kg (343 lb)   BMI 53.72 kg/m²     Physical Exam  Vitals reviewed.   Cardiovascular:      Heart sounds: Normal heart sounds, S1 normal and S2 normal. No murmur heard.     No friction rub.   Pulmonary:      Effort: Pulmonary effort is normal.      Breath sounds: Normal breath sounds and air entry.   Abdominal:      " Palpations: There is no hepatomegaly, splenomegaly or mass.   Musculoskeletal:      Right lower leg: No edema.      Left lower leg: No edema.   Lymphadenopathy:      Lower Body: No right inguinal adenopathy. No left inguinal adenopathy.   Neurological:      Cranial Nerves: Cranial nerves 2-12 are intact.      Sensory: No sensory deficit.      Motor: Motor function is intact.      Deep Tendon Reflexes: Reflexes are normal and symmetric.     LAB WORK:  Laboratory testing discussed.    Assessment/Plan   Problem List Items Addressed This Visit             ICD-10-CM       Cardiac and Vasculature    Hyperlipidemia E78.5    Relevant Medications    atorvastatin (Lipitor) 10 mg tablet    Other Relevant Orders    Comprehensive Metabolic Panel    Hypertension I10    Relevant Medications    amLODIPine-benazepriL (Lotrel) 10-40 mg capsule    losartan (Cozaar) 50 mg tablet       Endocrine/Metabolic    Diabetes mellitus (Multi) E11.9    Relevant Medications    tirzepatide (Mounjaro) 2.5 mg/0.5 mL pen injector (Start on 8/25/2024)    metFORMIN (Glucophage) 1,000 mg tablet    Other Relevant Orders    Hemoglobin A1C    Hypothyroidism E03.9    Relevant Medications    levothyroxine (Synthroid, Levoxyl) 50 mcg tablet    Other Relevant Orders    Thyroid Stimulating Hormone       Genitourinary and Reproductive    UTI (urinary tract infection) N39.0    Relevant Medications    ciprofloxacin (Cipro) 500 mg tablet     Other Visit Diagnoses         Codes    Visit for screening mammogram    -  Primary Z12.31    Relevant Orders    BI mammo bilateral screening tomosynthesis    Diabetic feet (Multi)     E11.8        1. Type 2 diabetes.  Continue good work.  We are going to stop Janumet, replace it with metformin and Mounjaro combination and I explained to her every four weeks we will go up on the dose.  2. Diabetic nephropathy, diabetic kidney, stage 2-3 failure.  She is already on losartan, drink enough water.  I will just keep an eye.  3. High  cholesterol, excellent.  4. Hypothyroid.  Thyroid okay.  5. Diabetic feet.  Feet are okay.  6. UTI.  She passed the kidney stone.  Cipro given.  7. I planned to see her back in four weeks for review, but blood work not due in three months.    Scribe Attestation  By signing my name below, I, Onofre Rubi attest that this documentation has been prepared under the direction and in the presence of Mateo Gama MD.

## 2024-09-09 ENCOUNTER — APPOINTMENT (OUTPATIENT)
Dept: PRIMARY CARE | Facility: CLINIC | Age: 64
End: 2024-09-09
Payer: COMMERCIAL

## 2024-09-09 VITALS
SYSTOLIC BLOOD PRESSURE: 138 MMHG | HEIGHT: 67 IN | BODY MASS INDEX: 45.99 KG/M2 | DIASTOLIC BLOOD PRESSURE: 76 MMHG | WEIGHT: 293 LBS

## 2024-09-09 DIAGNOSIS — E78.5 HYPERLIPIDEMIA, UNSPECIFIED HYPERLIPIDEMIA TYPE: ICD-10-CM

## 2024-09-09 DIAGNOSIS — N18.2 TYPE 2 DIABETES MELLITUS WITH STAGE 2 CHRONIC KIDNEY DISEASE, UNSPECIFIED WHETHER LONG TERM INSULIN USE (MULTI): ICD-10-CM

## 2024-09-09 DIAGNOSIS — E03.9 HYPOTHYROIDISM, UNSPECIFIED TYPE: ICD-10-CM

## 2024-09-09 DIAGNOSIS — E11.22 TYPE 2 DIABETES MELLITUS WITH STAGE 2 CHRONIC KIDNEY DISEASE, UNSPECIFIED WHETHER LONG TERM INSULIN USE (MULTI): ICD-10-CM

## 2024-09-09 PROBLEM — H25.10 NUCLEAR SENILE CATARACT: Status: ACTIVE | Noted: 2024-09-09

## 2024-09-09 PROBLEM — S93.609A SPRAIN OF FOOT: Status: ACTIVE | Noted: 2024-09-09

## 2024-09-09 PROBLEM — R60.0 EDEMA OF FOOT: Status: ACTIVE | Noted: 2024-09-09

## 2024-09-09 PROCEDURE — 1159F MED LIST DOCD IN RCRD: CPT | Performed by: INTERNAL MEDICINE

## 2024-09-09 PROCEDURE — 3078F DIAST BP <80 MM HG: CPT | Performed by: INTERNAL MEDICINE

## 2024-09-09 PROCEDURE — 3048F LDL-C <100 MG/DL: CPT | Performed by: INTERNAL MEDICINE

## 2024-09-09 PROCEDURE — 4010F ACE/ARB THERAPY RXD/TAKEN: CPT | Performed by: INTERNAL MEDICINE

## 2024-09-09 PROCEDURE — 3051F HG A1C>EQUAL 7.0%<8.0%: CPT | Performed by: INTERNAL MEDICINE

## 2024-09-09 PROCEDURE — 3075F SYST BP GE 130 - 139MM HG: CPT | Performed by: INTERNAL MEDICINE

## 2024-09-09 PROCEDURE — 3008F BODY MASS INDEX DOCD: CPT | Performed by: INTERNAL MEDICINE

## 2024-09-09 PROCEDURE — 99213 OFFICE O/P EST LOW 20 MIN: CPT | Performed by: INTERNAL MEDICINE

## 2024-09-09 RX ORDER — LEVOTHYROXINE SODIUM 50 UG/1
50 TABLET ORAL DAILY
Qty: 90 TABLET | Refills: 1 | Status: SHIPPED | OUTPATIENT
Start: 2024-09-09 | End: 2024-12-08

## 2024-09-13 DIAGNOSIS — E08.00 DIABETES MELLITUS DUE TO UNDERLYING CONDITION WITH HYPEROSMOLARITY WITHOUT COMA, WITHOUT LONG-TERM CURRENT USE OF INSULIN: Primary | ICD-10-CM

## 2024-09-13 RX ORDER — TIRZEPATIDE 5 MG/.5ML
5 INJECTION, SOLUTION SUBCUTANEOUS WEEKLY
Qty: 2 ML | Refills: 0 | Status: SHIPPED | OUTPATIENT
Start: 2024-09-13

## 2024-09-13 NOTE — PROGRESS NOTES
"Subjective   Patient ID: Isabel Whitley is a 63 y.o. female who presents for Follow-up.    Ms. King Britt Key today came here for follow-up on various conditions.  Overall, she is a happy person.  She is liking her weight loss drug.  Feeling better.  No new problem.  She wants to go up on the dose.  Appetite and weight are okay.  No problem. Weight loss okay.    I have personally reviewed the patient's Past Medical History, Medications, Allergies, Social History, and Family History in the EMR.    Review of Systems   All other systems reviewed and are negative.    Objective   /76   Ht 1.702 m (5' 7\")   Wt (!) 151 kg (333 lb)   BMI 52.16 kg/m²     Physical Exam  Vitals reviewed.   Cardiovascular:      Heart sounds: Normal heart sounds, S1 normal and S2 normal. No murmur heard.     No friction rub.   Pulmonary:      Effort: Pulmonary effort is normal.      Breath sounds: Normal breath sounds and air entry.   Abdominal:      Palpations: There is no hepatomegaly, splenomegaly or mass.   Musculoskeletal:      Right lower leg: No edema.      Left lower leg: No edema.   Lymphadenopathy:      Lower Body: No right inguinal adenopathy. No left inguinal adenopathy.   Neurological:      Cranial Nerves: Cranial nerves 2-12 are intact.      Sensory: No sensory deficit.      Motor: Motor function is intact.      Deep Tendon Reflexes: Reflexes are normal and symmetric.     LAB WORK: Laboratory testing discussed.    Assessment/Plan   Problem List Items Addressed This Visit             ICD-10-CM       Cardiac and Vasculature    Hyperlipidemia E78.5    Relevant Orders    Comprehensive Metabolic Panel    Lipid Panel       Endocrine/Metabolic    Diabetes mellitus (Multi) E11.9    Relevant Orders    Hemoglobin A1C    Hypothyroidism E03.9    Relevant Medications    levothyroxine (Synthroid, Levoxyl) 50 mcg tablet    Other Relevant Orders    Thyroid Stimulating Hormone   1. Type 2 diabetes.  Hemoglobin A1c is improving.  2. " Weight loss okay.  Weight loss program is okay.  No side effects.  I am going to go up on the dose of GLP 2.  3. Hypertension, okay.  4. Cholesterol, stable.  5. Thyroid, okay.  6. Blood work discussed.  7. Follow-up in a month.  8. Continue to follow.    Scribe Attestation  By signing my name below, IKate Scribe attest that this documentation has been prepared under the direction and in the presence of Mateo Gama MD.

## 2024-09-26 ENCOUNTER — TELEMEDICINE (OUTPATIENT)
Dept: PRIMARY CARE | Facility: CLINIC | Age: 64
End: 2024-09-26
Payer: COMMERCIAL

## 2024-09-26 DIAGNOSIS — R11.0 NAUSEA: ICD-10-CM

## 2024-09-26 DIAGNOSIS — E08.00 DIABETES MELLITUS DUE TO UNDERLYING CONDITION WITH HYPEROSMOLARITY WITHOUT COMA, WITHOUT LONG-TERM CURRENT USE OF INSULIN: ICD-10-CM

## 2024-09-26 PROCEDURE — 1159F MED LIST DOCD IN RCRD: CPT | Performed by: INTERNAL MEDICINE

## 2024-09-26 PROCEDURE — 4010F ACE/ARB THERAPY RXD/TAKEN: CPT | Performed by: INTERNAL MEDICINE

## 2024-09-26 PROCEDURE — 99213 OFFICE O/P EST LOW 20 MIN: CPT | Performed by: INTERNAL MEDICINE

## 2024-09-26 PROCEDURE — 3048F LDL-C <100 MG/DL: CPT | Performed by: INTERNAL MEDICINE

## 2024-09-26 PROCEDURE — 3051F HG A1C>EQUAL 7.0%<8.0%: CPT | Performed by: INTERNAL MEDICINE

## 2024-09-26 RX ORDER — ONDANSETRON 4 MG/1
4 TABLET, FILM COATED ORAL EVERY 8 HOURS PRN
Qty: 60 TABLET | Refills: 0 | Status: SHIPPED | OUTPATIENT
Start: 2024-09-26

## 2024-09-26 RX ORDER — TIRZEPATIDE 5 MG/.5ML
5 INJECTION, SOLUTION SUBCUTANEOUS WEEKLY
Qty: 2 ML | Refills: 0 | Status: SHIPPED | OUTPATIENT
Start: 2024-09-26

## 2024-09-27 NOTE — PROGRESS NOTES
Subjective   Patient ID: Isabel Whitley is a 63 y.o. female who presents for Follow-up.    Britt Whitley today came here for follow-up on various conditions.  Overall, she is a happy person.  Diabetes okay.  She is on weight loss program.  She is on 5 mg Mounjaro. She lost nearly 30 pounds.  She is doing good.  Some nausea.  Otherwise okay.    I have personally reviewed the patient's Past Medical History, Medications, Allergies, Social History, and Family History in the EMR.    Review of Systems   All other systems reviewed and are negative.    Objective   Virtual exam. There were no vitals taken for this visit.    Physical Exam  Musculoskeletal:      Right lower leg: No edema.      Left lower leg: No edema.     LAB WORK: Laboratory testing discussed.    Assessment/Plan   Problem List Items Addressed This Visit             ICD-10-CM       Endocrine/Metabolic    Diabetes mellitus (Multi) E11.9    Relevant Medications    tirzepatide (Mounjaro) 5 mg/0.5 mL pen injector     Other Visit Diagnoses         Codes    Nausea     R11.0    Relevant Medications    ondansetron (Zofran) 4 mg tablet        1. Type 2 diabetes, weight loss.  Mounjaro 5 mg working.  Continue.  2. Nausea.  Zofran given.  Supportive care.  3. Continue to follow.  4. Time spent, 22 minutes, more than half in direct patient care.    Scribe Attestation  By signing my name below, IKate Scribe attest that this documentation has been prepared under the direction and in the presence of Mateo Gama MD.

## 2024-11-05 ENCOUNTER — LAB (OUTPATIENT)
Dept: LAB | Facility: LAB | Age: 64
End: 2024-11-05
Payer: COMMERCIAL

## 2024-11-05 DIAGNOSIS — E78.5 HYPERLIPIDEMIA, UNSPECIFIED HYPERLIPIDEMIA TYPE: ICD-10-CM

## 2024-11-05 DIAGNOSIS — E03.9 HYPOTHYROIDISM, UNSPECIFIED TYPE: ICD-10-CM

## 2024-11-05 DIAGNOSIS — E11.22 TYPE 2 DIABETES MELLITUS WITH STAGE 2 CHRONIC KIDNEY DISEASE, UNSPECIFIED WHETHER LONG TERM INSULIN USE (MULTI): ICD-10-CM

## 2024-11-05 DIAGNOSIS — N18.2 TYPE 2 DIABETES MELLITUS WITH STAGE 2 CHRONIC KIDNEY DISEASE, UNSPECIFIED WHETHER LONG TERM INSULIN USE (MULTI): ICD-10-CM

## 2024-11-05 DIAGNOSIS — E13.9 OTHER SPECIFIED DIABETES MELLITUS WITHOUT COMPLICATION, WITHOUT LONG-TERM CURRENT USE OF INSULIN: ICD-10-CM

## 2024-11-05 LAB
ALBUMIN SERPL BCP-MCNC: 4.4 G/DL (ref 3.4–5)
ALP SERPL-CCNC: 59 U/L (ref 33–136)
ALT SERPL W P-5'-P-CCNC: 20 U/L (ref 7–45)
ANION GAP SERPL CALC-SCNC: 16 MMOL/L (ref 10–20)
AST SERPL W P-5'-P-CCNC: 16 U/L (ref 9–39)
BILIRUB SERPL-MCNC: 1.4 MG/DL (ref 0–1.2)
BUN SERPL-MCNC: 20 MG/DL (ref 6–23)
CALCIUM SERPL-MCNC: 9.8 MG/DL (ref 8.6–10.6)
CHLORIDE SERPL-SCNC: 105 MMOL/L (ref 98–107)
CHOLEST SERPL-MCNC: 105 MG/DL (ref 0–199)
CHOLESTEROL/HDL RATIO: 2.9
CO2 SERPL-SCNC: 24 MMOL/L (ref 21–32)
CREAT SERPL-MCNC: 1.38 MG/DL (ref 0.5–1.05)
EGFRCR SERPLBLD CKD-EPI 2021: 42 ML/MIN/1.73M*2
EST. AVERAGE GLUCOSE BLD GHB EST-MCNC: 134 MG/DL
GLUCOSE SERPL-MCNC: 86 MG/DL (ref 74–99)
HBA1C MFR BLD: 6.3 %
HDLC SERPL-MCNC: 36.6 MG/DL
LDLC SERPL CALC-MCNC: 28 MG/DL
NON HDL CHOLESTEROL: 68 MG/DL (ref 0–149)
POTASSIUM SERPL-SCNC: 4.8 MMOL/L (ref 3.5–5.3)
PROT SERPL-MCNC: 6.9 G/DL (ref 6.4–8.2)
SODIUM SERPL-SCNC: 140 MMOL/L (ref 136–145)
TRIGL SERPL-MCNC: 200 MG/DL (ref 0–149)
TSH SERPL-ACNC: 2.38 MIU/L (ref 0.44–3.98)
VLDL: 40 MG/DL (ref 0–40)

## 2024-11-05 PROCEDURE — 80061 LIPID PANEL: CPT

## 2024-11-05 PROCEDURE — 80053 COMPREHEN METABOLIC PANEL: CPT

## 2024-11-05 PROCEDURE — 83036 HEMOGLOBIN GLYCOSYLATED A1C: CPT

## 2024-11-05 PROCEDURE — 84443 ASSAY THYROID STIM HORMONE: CPT

## 2024-11-05 PROCEDURE — 36415 COLL VENOUS BLD VENIPUNCTURE: CPT

## 2024-11-07 ENCOUNTER — OFFICE VISIT (OUTPATIENT)
Dept: PRIMARY CARE | Facility: CLINIC | Age: 64
End: 2024-11-07
Payer: COMMERCIAL

## 2024-11-07 VITALS
HEIGHT: 67 IN | BODY MASS INDEX: 45.99 KG/M2 | SYSTOLIC BLOOD PRESSURE: 138 MMHG | DIASTOLIC BLOOD PRESSURE: 78 MMHG | WEIGHT: 293 LBS

## 2024-11-07 DIAGNOSIS — R79.89 ELEVATED SERUM CREATININE: ICD-10-CM

## 2024-11-07 DIAGNOSIS — E03.9 HYPOTHYROIDISM, UNSPECIFIED TYPE: ICD-10-CM

## 2024-11-07 DIAGNOSIS — R11.0 NAUSEA: ICD-10-CM

## 2024-11-07 DIAGNOSIS — E08.00 DIABETES MELLITUS DUE TO UNDERLYING CONDITION WITH HYPEROSMOLARITY WITHOUT COMA, WITHOUT LONG-TERM CURRENT USE OF INSULIN: ICD-10-CM

## 2024-11-07 DIAGNOSIS — I10 HYPERTENSION, UNSPECIFIED TYPE: ICD-10-CM

## 2024-11-07 DIAGNOSIS — E13.9 OTHER SPECIFIED DIABETES MELLITUS WITHOUT COMPLICATION, WITHOUT LONG-TERM CURRENT USE OF INSULIN: ICD-10-CM

## 2024-11-07 DIAGNOSIS — E78.5 HYPERLIPIDEMIA, UNSPECIFIED HYPERLIPIDEMIA TYPE: ICD-10-CM

## 2024-11-07 DIAGNOSIS — R79.9 ELEVATED BUN: Primary | ICD-10-CM

## 2024-11-07 PROCEDURE — 3078F DIAST BP <80 MM HG: CPT | Performed by: INTERNAL MEDICINE

## 2024-11-07 PROCEDURE — 3044F HG A1C LEVEL LT 7.0%: CPT | Performed by: INTERNAL MEDICINE

## 2024-11-07 PROCEDURE — 4010F ACE/ARB THERAPY RXD/TAKEN: CPT | Performed by: INTERNAL MEDICINE

## 2024-11-07 PROCEDURE — 3048F LDL-C <100 MG/DL: CPT | Performed by: INTERNAL MEDICINE

## 2024-11-07 PROCEDURE — 3008F BODY MASS INDEX DOCD: CPT | Performed by: INTERNAL MEDICINE

## 2024-11-07 PROCEDURE — 3075F SYST BP GE 130 - 139MM HG: CPT | Performed by: INTERNAL MEDICINE

## 2024-11-07 PROCEDURE — 99214 OFFICE O/P EST MOD 30 MIN: CPT | Performed by: INTERNAL MEDICINE

## 2024-11-07 PROCEDURE — 1159F MED LIST DOCD IN RCRD: CPT | Performed by: INTERNAL MEDICINE

## 2024-11-07 RX ORDER — LEVOTHYROXINE SODIUM 50 UG/1
50 TABLET ORAL DAILY
Qty: 90 TABLET | Refills: 1 | Status: SHIPPED | OUTPATIENT
Start: 2024-11-07

## 2024-11-07 RX ORDER — METFORMIN HYDROCHLORIDE 1000 MG/1
1000 TABLET ORAL
Qty: 180 TABLET | Refills: 0 | Status: SHIPPED | OUTPATIENT
Start: 2024-11-07

## 2024-11-07 RX ORDER — ONDANSETRON 4 MG/1
4 TABLET, FILM COATED ORAL EVERY 8 HOURS PRN
Qty: 180 TABLET | Refills: 0 | Status: SHIPPED | OUTPATIENT
Start: 2024-11-07

## 2024-11-07 RX ORDER — TIRZEPATIDE 5 MG/.5ML
5 INJECTION, SOLUTION SUBCUTANEOUS WEEKLY
Qty: 2 ML | Refills: 1 | Status: SHIPPED | OUTPATIENT
Start: 2024-11-07

## 2024-11-07 RX ORDER — LOSARTAN POTASSIUM 50 MG/1
50 TABLET ORAL DAILY
Qty: 90 TABLET | Refills: 0 | Status: SHIPPED | OUTPATIENT
Start: 2024-11-07

## 2024-11-07 RX ORDER — ATORVASTATIN CALCIUM 10 MG/1
TABLET, FILM COATED ORAL
Qty: 90 TABLET | Refills: 3 | Status: SHIPPED | OUTPATIENT
Start: 2024-11-07

## 2024-11-07 RX ORDER — AMLODIPINE AND BENAZEPRIL HYDROCHLORIDE 10; 40 MG/1; MG/1
1 CAPSULE ORAL DAILY
Qty: 90 CAPSULE | Refills: 0 | Status: SHIPPED | OUTPATIENT
Start: 2024-11-07

## 2024-11-08 NOTE — PROGRESS NOTES
"Subjective   Patient ID: Isabel Whitley is a 64 y.o. female who presents for Med Refill.    Britt Whitley today came here for multiple medical issues.  1. Overall okay.  She came for follow-up on various conditions.  2. She is worried about her kidney function.  BUN and creatinine slowly going up.  She admits she is not drinking enough water or liquids.  She came for follow-up.  She is very happy with weight loss program and she lost 40 pounds in 12 weeks, good response.  She came for follow-up on various conditions.    I have personally reviewed the patient's Past Medical History, Medications, Allergies, Social History, and Family History in the EMR.    Med Refill    Review of Systems   All other systems reviewed and are negative.    Objective   /78   Ht 1.702 m (5' 7\")   Wt 137 kg (303 lb)   BMI 47.46 kg/m²     Physical Exam  Vitals reviewed.   Cardiovascular:      Heart sounds: Normal heart sounds, S1 normal and S2 normal. No murmur heard.     No friction rub.   Pulmonary:      Effort: Pulmonary effort is normal.      Breath sounds: Normal breath sounds and air entry.   Abdominal:      Palpations: There is no hepatomegaly, splenomegaly or mass.   Musculoskeletal:      Right lower leg: No edema.      Left lower leg: No edema.   Lymphadenopathy:      Lower Body: No right inguinal adenopathy. No left inguinal adenopathy.   Neurological:      Cranial Nerves: Cranial nerves 2-12 are intact.      Sensory: No sensory deficit.      Motor: Motor function is intact.      Deep Tendon Reflexes: Reflexes are normal and symmetric.     LAB WORK: Laboratory testing discussed.    Assessment/Plan   Problem List Items Addressed This Visit             ICD-10-CM       Cardiac and Vasculature    Hyperlipidemia E78.5    Relevant Orders    Comprehensive Metabolic Panel    Comprehensive Metabolic Panel    Lipid Panel    Hypertension I10       Endocrine/Metabolic    Diabetes mellitus (Multi) E11.9    Relevant Medications "    tirzepatide (Mounjaro) 5 mg/0.5 mL pen injector    Other Relevant Orders    Hemoglobin A1C    Hypothyroidism E03.9    Relevant Orders    Thyroid Stimulating Hormone     Other Visit Diagnoses         Codes    Elevated BUN    -  Primary R79.9    Nausea     R11.0    Elevated serum creatinine     R79.89        1. BUN and creatinine up.  I am going to recheck the numbers.  Drink enough water.  2. Nausea.  Zofran given.  3. Type 2 diabetes, weight.  Current dose 5 mg is working well.  Continue.  4. Hypertension, okay.  5. Cholesterol, stable.  7. Kidney function test review in two weeks.  8. Repeat blood work in January.  Meanwhile we will keep same dose of Mounjaro.  We will monitor kidney.    Scribe Attestation  By signing my name below, I, Onofre Rubi attest that this documentation has been prepared under the direction and in the presence of Mateo Gama MD.

## 2024-11-25 ENCOUNTER — LAB (OUTPATIENT)
Dept: LAB | Facility: LAB | Age: 64
End: 2024-11-25
Payer: COMMERCIAL

## 2024-11-25 DIAGNOSIS — E78.5 HYPERLIPIDEMIA, UNSPECIFIED HYPERLIPIDEMIA TYPE: ICD-10-CM

## 2024-11-25 LAB
ALBUMIN SERPL BCP-MCNC: 4.7 G/DL (ref 3.4–5)
ALP SERPL-CCNC: 51 U/L (ref 33–136)
ALT SERPL W P-5'-P-CCNC: 24 U/L (ref 7–45)
ANION GAP SERPL CALC-SCNC: 16 MMOL/L (ref 10–20)
AST SERPL W P-5'-P-CCNC: 19 U/L (ref 9–39)
BILIRUB SERPL-MCNC: 1.3 MG/DL (ref 0–1.2)
BUN SERPL-MCNC: 31 MG/DL (ref 6–23)
CALCIUM SERPL-MCNC: 10.5 MG/DL (ref 8.6–10.6)
CHLORIDE SERPL-SCNC: 104 MMOL/L (ref 98–107)
CO2 SERPL-SCNC: 24 MMOL/L (ref 21–32)
CREAT SERPL-MCNC: 1.52 MG/DL (ref 0.5–1.05)
EGFRCR SERPLBLD CKD-EPI 2021: 38 ML/MIN/1.73M*2
GLUCOSE SERPL-MCNC: 85 MG/DL (ref 74–99)
POTASSIUM SERPL-SCNC: 5.1 MMOL/L (ref 3.5–5.3)
PROT SERPL-MCNC: 7.1 G/DL (ref 6.4–8.2)
SODIUM SERPL-SCNC: 139 MMOL/L (ref 136–145)

## 2024-11-25 PROCEDURE — 80053 COMPREHEN METABOLIC PANEL: CPT

## 2024-11-25 PROCEDURE — 36415 COLL VENOUS BLD VENIPUNCTURE: CPT

## 2024-12-18 ENCOUNTER — APPOINTMENT (OUTPATIENT)
Dept: PRIMARY CARE | Facility: CLINIC | Age: 64
End: 2024-12-18
Payer: COMMERCIAL

## 2024-12-18 VITALS
BODY MASS INDEX: 45.36 KG/M2 | HEIGHT: 67 IN | DIASTOLIC BLOOD PRESSURE: 74 MMHG | SYSTOLIC BLOOD PRESSURE: 136 MMHG | WEIGHT: 289 LBS

## 2024-12-18 DIAGNOSIS — E03.9 HYPOTHYROIDISM, UNSPECIFIED TYPE: ICD-10-CM

## 2024-12-18 DIAGNOSIS — E78.5 HYPERLIPIDEMIA, UNSPECIFIED HYPERLIPIDEMIA TYPE: ICD-10-CM

## 2024-12-18 DIAGNOSIS — N28.9 RENAL INSUFFICIENCY: Primary | ICD-10-CM

## 2024-12-18 DIAGNOSIS — E08.00 DIABETES MELLITUS DUE TO UNDERLYING CONDITION WITH HYPEROSMOLARITY WITHOUT COMA, WITHOUT LONG-TERM CURRENT USE OF INSULIN: ICD-10-CM

## 2024-12-18 DIAGNOSIS — I10 HYPERTENSION, UNSPECIFIED TYPE: ICD-10-CM

## 2024-12-18 PROCEDURE — 99213 OFFICE O/P EST LOW 20 MIN: CPT | Performed by: INTERNAL MEDICINE

## 2024-12-18 PROCEDURE — 4010F ACE/ARB THERAPY RXD/TAKEN: CPT | Performed by: INTERNAL MEDICINE

## 2024-12-18 PROCEDURE — 3075F SYST BP GE 130 - 139MM HG: CPT | Performed by: INTERNAL MEDICINE

## 2024-12-18 PROCEDURE — 3048F LDL-C <100 MG/DL: CPT | Performed by: INTERNAL MEDICINE

## 2024-12-18 PROCEDURE — 3044F HG A1C LEVEL LT 7.0%: CPT | Performed by: INTERNAL MEDICINE

## 2024-12-18 PROCEDURE — 3008F BODY MASS INDEX DOCD: CPT | Performed by: INTERNAL MEDICINE

## 2024-12-18 PROCEDURE — 3078F DIAST BP <80 MM HG: CPT | Performed by: INTERNAL MEDICINE

## 2024-12-18 PROCEDURE — 1159F MED LIST DOCD IN RCRD: CPT | Performed by: INTERNAL MEDICINE

## 2024-12-18 RX ORDER — TIRZEPATIDE 5 MG/.5ML
5 INJECTION, SOLUTION SUBCUTANEOUS WEEKLY
Qty: 2 ML | Refills: 1 | Status: SHIPPED | OUTPATIENT
Start: 2024-12-18

## 2024-12-19 NOTE — PROGRESS NOTES
"Subjective   Patient ID: Isabel Whitley is a 64 y.o. female who presents for Follow-up and Results.    It is always a delight to serve Ms. Whitley.  Today, she came here for follow-up on various conditions.  Overall, she is a happy person.  I noticed she is on lisinopril, amlodipine combo as well as she is on losartan.  Other than that she is okay.  Her progress with Mounjaro is dam good in the matter 16 weeks she lost 60 pounds, only 5 mg, she is doing really good.  No side effects.  Nausea under controlled.  Follow-up on other conditions.    I have personally reviewed the patient's Past Medical History, Medications, Allergies, Social History, and Family History in the EMR.    Review of Systems   All other systems reviewed and are negative.    Objective   /74   Ht 1.702 m (5' 7\")   Wt 131 kg (289 lb)   BMI 45.26 kg/m²     Physical Exam  Vitals reviewed.   Cardiovascular:      Heart sounds: Normal heart sounds, S1 normal and S2 normal. No murmur heard.     No friction rub.   Pulmonary:      Effort: Pulmonary effort is normal.      Breath sounds: Normal breath sounds and air entry.   Abdominal:      Palpations: There is no hepatomegaly, splenomegaly or mass.   Musculoskeletal:      Right lower leg: No edema.      Left lower leg: No edema.   Lymphadenopathy:      Lower Body: No right inguinal adenopathy. No left inguinal adenopathy.   Neurological:      Cranial Nerves: Cranial nerves 2-12 are intact.      Sensory: No sensory deficit.      Motor: Motor function is intact.      Deep Tendon Reflexes: Reflexes are normal and symmetric.     LAB WORK:  Laboratory testing discussed.    Assessment/Plan   Problem List Items Addressed This Visit             ICD-10-CM       Cardiac and Vasculature    Hyperlipidemia E78.5    Relevant Orders    Comprehensive Metabolic Panel    Hypertension I10       Endocrine/Metabolic    Diabetes mellitus (Multi) E11.9    Relevant Medications    tirzepatide (Mounjaro) 5 mg/0.5 mL pen " injector    Hypothyroidism E03.9     Other Visit Diagnoses         Codes    Renal insufficiency    -  Primary N28.9        1. Renal insufficiency.  I think it is because of weight loss.  We get rid of lisinopril, amlodipine.  Continue only losartan.  I will monitor kidney.  Continue drinking more water.  2. Type 2 diabetes.  Hemoglobin A1c okay.  3. Hypertension, okay.  4. High cholesterol, okay.  5. Weight loss program, phenomenal results.  We are going to keep same dose.  6. Thyroid.  Monitor.  7. I shall see her back in four weeks after blood work or one month to recheck kidneys.    Ebonieibe Attestation  By signing my name below, I, Onofre Rubi attest that this documentation has been prepared under the direction and in the presence of Mateo Gama MD.     All medical record entries made by the scribe were personally dictated by me I have reviewed the chart and agree the record accurately reflects my personal performance of his history physical examination and management

## 2025-02-12 DIAGNOSIS — I10 HYPERTENSION, UNSPECIFIED TYPE: ICD-10-CM

## 2025-02-12 RX ORDER — LOSARTAN POTASSIUM 50 MG/1
50 TABLET ORAL DAILY
Qty: 90 TABLET | Refills: 0 | Status: SHIPPED | OUTPATIENT
Start: 2025-02-12

## 2025-02-14 LAB
ALBUMIN SERPL-MCNC: 4.2 G/DL (ref 3.6–5.1)
ALP SERPL-CCNC: 59 U/L (ref 37–153)
ALT SERPL-CCNC: 16 U/L (ref 6–29)
ANION GAP SERPL CALCULATED.4IONS-SCNC: 10 MMOL/L (CALC) (ref 7–17)
AST SERPL-CCNC: 16 U/L (ref 10–35)
BILIRUB SERPL-MCNC: 1 MG/DL (ref 0.2–1.2)
BUN SERPL-MCNC: 24 MG/DL (ref 7–25)
CALCIUM SERPL-MCNC: 9.3 MG/DL (ref 8.6–10.4)
CHLORIDE SERPL-SCNC: 107 MMOL/L (ref 98–110)
CO2 SERPL-SCNC: 23 MMOL/L (ref 20–32)
CREAT SERPL-MCNC: 1.33 MG/DL (ref 0.5–1.05)
EGFRCR SERPLBLD CKD-EPI 2021: 44 ML/MIN/1.73M2
GLUCOSE SERPL-MCNC: 132 MG/DL (ref 65–99)
POTASSIUM SERPL-SCNC: 4.6 MMOL/L (ref 3.5–5.3)
PROT SERPL-MCNC: 6.4 G/DL (ref 6.1–8.1)
SODIUM SERPL-SCNC: 140 MMOL/L (ref 135–146)

## 2025-02-25 ENCOUNTER — APPOINTMENT (OUTPATIENT)
Dept: PRIMARY CARE | Facility: CLINIC | Age: 65
End: 2025-02-25
Payer: COMMERCIAL

## 2025-02-25 VITALS
DIASTOLIC BLOOD PRESSURE: 90 MMHG | SYSTOLIC BLOOD PRESSURE: 150 MMHG | BODY MASS INDEX: 45.04 KG/M2 | HEIGHT: 67 IN | WEIGHT: 287 LBS

## 2025-02-25 DIAGNOSIS — Z12.31 SCREENING MAMMOGRAM, ENCOUNTER FOR: ICD-10-CM

## 2025-02-25 DIAGNOSIS — E13.9 OTHER SPECIFIED DIABETES MELLITUS WITHOUT COMPLICATION, WITHOUT LONG-TERM CURRENT USE OF INSULIN: ICD-10-CM

## 2025-02-25 DIAGNOSIS — M89.8X1 CLAVICLE PAIN: ICD-10-CM

## 2025-02-25 DIAGNOSIS — E78.5 HYPERLIPIDEMIA, UNSPECIFIED HYPERLIPIDEMIA TYPE: ICD-10-CM

## 2025-02-25 DIAGNOSIS — E03.9 HYPOTHYROIDISM, UNSPECIFIED TYPE: ICD-10-CM

## 2025-02-25 DIAGNOSIS — I10 HYPERTENSION, UNSPECIFIED TYPE: ICD-10-CM

## 2025-02-25 PROCEDURE — 3008F BODY MASS INDEX DOCD: CPT | Performed by: INTERNAL MEDICINE

## 2025-02-25 PROCEDURE — 99214 OFFICE O/P EST MOD 30 MIN: CPT | Performed by: INTERNAL MEDICINE

## 2025-02-25 PROCEDURE — 4010F ACE/ARB THERAPY RXD/TAKEN: CPT | Performed by: INTERNAL MEDICINE

## 2025-02-25 PROCEDURE — 3080F DIAST BP >= 90 MM HG: CPT | Performed by: INTERNAL MEDICINE

## 2025-02-25 PROCEDURE — 1159F MED LIST DOCD IN RCRD: CPT | Performed by: INTERNAL MEDICINE

## 2025-02-25 PROCEDURE — 3077F SYST BP >= 140 MM HG: CPT | Performed by: INTERNAL MEDICINE

## 2025-02-25 RX ORDER — LEVOTHYROXINE SODIUM 50 UG/1
50 TABLET ORAL DAILY
Qty: 90 TABLET | Refills: 1 | Status: SHIPPED | OUTPATIENT
Start: 2025-02-25

## 2025-02-25 RX ORDER — LOSARTAN POTASSIUM 50 MG/1
50 TABLET ORAL DAILY
Qty: 90 TABLET | Refills: 0 | Status: SHIPPED | OUTPATIENT
Start: 2025-02-25

## 2025-02-25 RX ORDER — ATORVASTATIN CALCIUM 10 MG/1
TABLET, FILM COATED ORAL
Qty: 90 TABLET | Refills: 3 | Status: SHIPPED | OUTPATIENT
Start: 2025-02-25

## 2025-02-25 RX ORDER — METFORMIN HYDROCHLORIDE 1000 MG/1
1000 TABLET ORAL
Qty: 180 TABLET | Refills: 0 | Status: SHIPPED | OUTPATIENT
Start: 2025-02-25

## 2025-02-25 RX ORDER — ACETAMINOPHEN 500 MG
5000 TABLET ORAL DAILY
Qty: 90 TABLET | Refills: 0 | Status: SHIPPED | OUTPATIENT
Start: 2025-02-25

## 2025-02-25 ASSESSMENT — ENCOUNTER SYMPTOMS
DEPRESSION: 0
LOSS OF SENSATION IN FEET: 0
OCCASIONAL FEELINGS OF UNSTEADINESS: 1

## 2025-02-26 NOTE — PROGRESS NOTES
"Subjective   Patient ID: Isabel Whitley is a 64 y.o. female who presents for Follow-up.    Britt Whitley today came here for multiple issues.  1. She is very furious.  She received the insurance letter from [employer] that she has multiple endocrine neoplasia syndrome and family history of medullary thyroid tumor.  It is not true.  She is furious with it.  2. Mounjaro helped her tremendously.  Her diabetes got better.  Hemoglobin A1c is very better and she needs to it to continue use.  3. New problem, she told me somewhat not pain, but prominence at her collarbone on the right side, it is not painful.  She told me she broke her both collar bone as a child and she grew out of it, but it becomes more prominent causing beauty concern rather than more pain.  Other than that she is okay.  No problem.  She came here for follow-up on blood work and other conditions.    I have personally reviewed the patient's Past Medical History, Medications, Allergies, Social History, and Family History in the EMR.    Review of Systems   All other systems reviewed and are negative.    Objective   /90   Ht 1.702 m (5' 7\")   Wt 130 kg (287 lb)   BMI 44.95 kg/m²     Physical Exam  Vitals reviewed.   Cardiovascular:      Heart sounds: Normal heart sounds, S1 normal and S2 normal. No murmur heard.     No friction rub.   Pulmonary:      Effort: Pulmonary effort is normal.      Breath sounds: Normal breath sounds and air entry.   Abdominal:      Palpations: There is no hepatomegaly, splenomegaly or mass.   Musculoskeletal:      Right lower leg: No edema.      Left lower leg: No edema.      Comments: Right manubrium and clavicular joint much prominent with the minimal ______ feeling to it, but nothing tender.  No dilated veins.  Pretty benign looking.  All on right side.  Left side is clean.   Lymphadenopathy:      Lower Body: No right inguinal adenopathy. No left inguinal adenopathy.   Neurological:      Cranial Nerves: " Cranial nerves 2-12 are intact.      Sensory: No sensory deficit.      Motor: Motor function is intact.      Deep Tendon Reflexes: Reflexes are normal and symmetric.     LAB WORK:  Laboratory testing discussed.    Assessment/Plan   Problem List Items Addressed This Visit             ICD-10-CM    Diabetes mellitus (Multi) E11.9    Relevant Medications    metFORMIN (Glucophage) 1,000 mg tablet    cholecalciferol (Vitamin D-3) 5,000 Units tablet    Hyperlipidemia E78.5    Relevant Medications    atorvastatin (Lipitor) 10 mg tablet    Hypertension I10    Relevant Medications    losartan (Cozaar) 50 mg tablet    Hypothyroidism E03.9    Relevant Medications    levothyroxine (Synthroid, Levoxyl) 50 mcg tablet     Other Visit Diagnoses         Codes    Screening mammogram, encounter for     Z12.31    Relevant Orders    BI mammo bilateral screening tomosynthesis    Clavicle pain     M89.8X1    Relevant Orders    XR clavicle bilateral complete        1. Mild chondroma at the junction of the collarbone, manubrium sterni.  X-ray is ordered.  I doubt it is a tumor, maybe chondroma.  Refer to Orthopedics if necessary, but I doubt.  We will start with x-ray.  At moment we do not need MRI.  2. Type 2 diabetes and weight.  The patient definitely does not have multiple endocrine neoplasia syndrome (MEN 2).  She does not have medullary thyroid carcinoma.  3. Her type 2 diabetes is very well controlled with Mounjaro, low-dose.  She should continue that and insurance should relent.  Information letter she got from [employer] is untrue that at least [facility] record does not confirm any of it and I will ask the insurance company to provide me the documentation in this respect.  Otherwise, she should be taking these drugs forever till it requires.  4. Hypertension, okay.  5. High cholesterol, stable.  6. Gynecological.  She needs her mammogram.  Blood work ordered.  7. Follow up in three months.  Always happy to see her anytime  sooner.  While she is waiting for Mounjaro approval, I gave her a box of same, but a little low dose so that there is no break.    Onofre Attestation  By signing my name below, I, Onofre Rubi attest that this documentation has been prepared under the direction and in the presence of Mateo Gama MD.     All medical record entries made by the paytonibsis were personally dictated by me I have reviewed the chart and agree the record accurately reflects my personal performance of his history physical examination and management

## 2025-02-27 PROBLEM — E08.00 DIABETES MELLITUS DUE TO UNDERLYING CONDITION WITH HYPEROSMOLARITY WITHOUT COMA, WITHOUT LONG-TERM CURRENT USE OF INSULIN: Status: ACTIVE | Noted: 2023-03-30

## 2025-02-27 PROBLEM — E08.00 DIABETES MELLITUS DUE TO UNDERLYING CONDITION WITH HYPEROSMOLARITY WITHOUT COMA, WITHOUT LONG-TERM CURRENT USE OF INSULIN: Status: RESOLVED | Noted: 2023-03-30 | Resolved: 2025-02-27

## 2025-03-09 ENCOUNTER — APPOINTMENT (OUTPATIENT)
Dept: RADIOLOGY | Facility: HOSPITAL | Age: 65
End: 2025-03-09
Payer: COMMERCIAL

## 2025-03-09 ENCOUNTER — HOSPITAL ENCOUNTER (EMERGENCY)
Facility: HOSPITAL | Age: 65
Discharge: HOME | End: 2025-03-09
Attending: EMERGENCY MEDICINE
Payer: COMMERCIAL

## 2025-03-09 VITALS
BODY MASS INDEX: 43.8 KG/M2 | HEART RATE: 87 BPM | SYSTOLIC BLOOD PRESSURE: 176 MMHG | HEIGHT: 68 IN | WEIGHT: 289 LBS | TEMPERATURE: 97.7 F | OXYGEN SATURATION: 97 % | RESPIRATION RATE: 20 BRPM | DIASTOLIC BLOOD PRESSURE: 106 MMHG

## 2025-03-09 DIAGNOSIS — N20.1 URETEROLITHIASIS: Primary | ICD-10-CM

## 2025-03-09 DIAGNOSIS — R10.9 FLANK PAIN: ICD-10-CM

## 2025-03-09 LAB
ALBUMIN SERPL BCP-MCNC: 4.2 G/DL (ref 3.4–5)
ALP SERPL-CCNC: 58 U/L (ref 33–136)
ALT SERPL W P-5'-P-CCNC: 15 U/L (ref 7–45)
ANION GAP SERPL CALCULATED.3IONS-SCNC: 16 MMOL/L (ref 10–20)
APPEARANCE UR: ABNORMAL
AST SERPL W P-5'-P-CCNC: 13 U/L (ref 9–39)
BACTERIA #/AREA URNS AUTO: ABNORMAL /HPF
BASOPHILS # BLD AUTO: 0.04 X10*3/UL (ref 0–0.1)
BASOPHILS NFR BLD AUTO: 0.4 %
BILIRUB SERPL-MCNC: 1 MG/DL (ref 0–1.2)
BILIRUB UR STRIP.AUTO-MCNC: NEGATIVE MG/DL
BUN SERPL-MCNC: 26 MG/DL (ref 6–23)
CALCIUM SERPL-MCNC: 10.2 MG/DL (ref 8.6–10.3)
CHLORIDE SERPL-SCNC: 106 MMOL/L (ref 98–107)
CO2 SERPL-SCNC: 22 MMOL/L (ref 21–32)
COLOR UR: ABNORMAL
CREAT SERPL-MCNC: 1.24 MG/DL (ref 0.5–1.05)
EGFRCR SERPLBLD CKD-EPI 2021: 48 ML/MIN/1.73M*2
EOSINOPHIL # BLD AUTO: 0.01 X10*3/UL (ref 0–0.7)
EOSINOPHIL NFR BLD AUTO: 0.1 %
ERYTHROCYTE [DISTWIDTH] IN BLOOD BY AUTOMATED COUNT: 13 % (ref 11.5–14.5)
GLUCOSE SERPL-MCNC: 145 MG/DL (ref 74–99)
GLUCOSE UR STRIP.AUTO-MCNC: NORMAL MG/DL
HCT VFR BLD AUTO: 38.5 % (ref 36–46)
HGB BLD-MCNC: 12.3 G/DL (ref 12–16)
IMM GRANULOCYTES # BLD AUTO: 0.05 X10*3/UL (ref 0–0.7)
IMM GRANULOCYTES NFR BLD AUTO: 0.4 % (ref 0–0.9)
KETONES UR STRIP.AUTO-MCNC: NEGATIVE MG/DL
LEUKOCYTE ESTERASE UR QL STRIP.AUTO: ABNORMAL
LYMPHOCYTES # BLD AUTO: 0.85 X10*3/UL (ref 1.2–4.8)
LYMPHOCYTES NFR BLD AUTO: 7.5 %
MCH RBC QN AUTO: 28.7 PG (ref 26–34)
MCHC RBC AUTO-ENTMCNC: 31.9 G/DL (ref 32–36)
MCV RBC AUTO: 90 FL (ref 80–100)
MONOCYTES # BLD AUTO: 0.24 X10*3/UL (ref 0.1–1)
MONOCYTES NFR BLD AUTO: 2.1 %
MUCOUS THREADS #/AREA URNS AUTO: ABNORMAL /LPF
NEUTROPHILS # BLD AUTO: 10.08 X10*3/UL (ref 1.2–7.7)
NEUTROPHILS NFR BLD AUTO: 89.5 %
NITRITE UR QL STRIP.AUTO: NEGATIVE
NRBC BLD-RTO: 0 /100 WBCS (ref 0–0)
PH UR STRIP.AUTO: 5.5 [PH]
PLATELET # BLD AUTO: 352 X10*3/UL (ref 150–450)
POTASSIUM SERPL-SCNC: 4.9 MMOL/L (ref 3.5–5.3)
PROT SERPL-MCNC: 7.2 G/DL (ref 6.4–8.2)
PROT UR STRIP.AUTO-MCNC: ABNORMAL MG/DL
RBC # BLD AUTO: 4.29 X10*6/UL (ref 4–5.2)
RBC # UR STRIP.AUTO: ABNORMAL MG/DL
RBC #/AREA URNS AUTO: >20 /HPF
SODIUM SERPL-SCNC: 139 MMOL/L (ref 136–145)
SP GR UR STRIP.AUTO: 1.02
SQUAMOUS #/AREA URNS AUTO: ABNORMAL /HPF
UROBILINOGEN UR STRIP.AUTO-MCNC: NORMAL MG/DL
WBC # BLD AUTO: 11.3 X10*3/UL (ref 4.4–11.3)
WBC #/AREA URNS AUTO: ABNORMAL /HPF
YEAST BUDDING #/AREA UR COMP ASSIST: PRESENT /HPF

## 2025-03-09 PROCEDURE — 81001 URINALYSIS AUTO W/SCOPE: CPT | Performed by: EMERGENCY MEDICINE

## 2025-03-09 PROCEDURE — 74176 CT ABD & PELVIS W/O CONTRAST: CPT | Mod: FOREIGN READ | Performed by: RADIOLOGY

## 2025-03-09 PROCEDURE — 96361 HYDRATE IV INFUSION ADD-ON: CPT

## 2025-03-09 PROCEDURE — 85025 COMPLETE CBC W/AUTO DIFF WBC: CPT | Performed by: EMERGENCY MEDICINE

## 2025-03-09 PROCEDURE — 80053 COMPREHEN METABOLIC PANEL: CPT | Performed by: EMERGENCY MEDICINE

## 2025-03-09 PROCEDURE — 74176 CT ABD & PELVIS W/O CONTRAST: CPT

## 2025-03-09 PROCEDURE — 87086 URINE CULTURE/COLONY COUNT: CPT | Mod: TRILAB | Performed by: EMERGENCY MEDICINE

## 2025-03-09 PROCEDURE — 96374 THER/PROPH/DIAG INJ IV PUSH: CPT

## 2025-03-09 PROCEDURE — 99284 EMERGENCY DEPT VISIT MOD MDM: CPT | Mod: 25 | Performed by: EMERGENCY MEDICINE

## 2025-03-09 PROCEDURE — 36415 COLL VENOUS BLD VENIPUNCTURE: CPT | Performed by: EMERGENCY MEDICINE

## 2025-03-09 PROCEDURE — 2500000004 HC RX 250 GENERAL PHARMACY W/ HCPCS (ALT 636 FOR OP/ED): Performed by: EMERGENCY MEDICINE

## 2025-03-09 PROCEDURE — 96375 TX/PRO/DX INJ NEW DRUG ADDON: CPT

## 2025-03-09 RX ORDER — TAMSULOSIN HYDROCHLORIDE 0.4 MG/1
0.4 CAPSULE ORAL DAILY
Qty: 4 CAPSULE | Refills: 0 | Status: SHIPPED | OUTPATIENT
Start: 2025-03-09 | End: 2025-03-09

## 2025-03-09 RX ORDER — MORPHINE SULFATE 4 MG/ML
4 INJECTION, SOLUTION INTRAMUSCULAR; INTRAVENOUS ONCE
Status: COMPLETED | OUTPATIENT
Start: 2025-03-09 | End: 2025-03-09

## 2025-03-09 RX ORDER — ONDANSETRON HYDROCHLORIDE 2 MG/ML
4 INJECTION, SOLUTION INTRAVENOUS ONCE
Status: COMPLETED | OUTPATIENT
Start: 2025-03-09 | End: 2025-03-09

## 2025-03-09 RX ORDER — TAMSULOSIN HYDROCHLORIDE 0.4 MG/1
0.4 CAPSULE ORAL DAILY
Qty: 4 CAPSULE | Refills: 0 | Status: SHIPPED | OUTPATIENT
Start: 2025-03-09 | End: 2025-03-13

## 2025-03-09 RX ORDER — ONDANSETRON 4 MG/1
4 TABLET, ORALLY DISINTEGRATING ORAL EVERY 8 HOURS PRN
Qty: 20 TABLET | Refills: 0 | Status: SHIPPED | OUTPATIENT
Start: 2025-03-09

## 2025-03-09 RX ORDER — KETOROLAC TROMETHAMINE 30 MG/ML
15 INJECTION, SOLUTION INTRAMUSCULAR; INTRAVENOUS ONCE
Status: COMPLETED | OUTPATIENT
Start: 2025-03-09 | End: 2025-03-09

## 2025-03-09 RX ORDER — ONDANSETRON 4 MG/1
4 TABLET, ORALLY DISINTEGRATING ORAL EVERY 8 HOURS PRN
Qty: 20 TABLET | Refills: 0 | Status: SHIPPED | OUTPATIENT
Start: 2025-03-09 | End: 2025-03-09

## 2025-03-09 RX ORDER — OXYCODONE HYDROCHLORIDE 5 MG/1
5 TABLET ORAL EVERY 6 HOURS PRN
Qty: 10 TABLET | Refills: 0 | Status: SHIPPED | OUTPATIENT
Start: 2025-03-09

## 2025-03-09 RX ADMIN — SODIUM CHLORIDE 1000 ML: 900 INJECTION, SOLUTION INTRAVENOUS at 19:23

## 2025-03-09 RX ADMIN — MORPHINE SULFATE 4 MG: 4 INJECTION, SOLUTION INTRAMUSCULAR; INTRAVENOUS at 19:23

## 2025-03-09 RX ADMIN — ONDANSETRON 4 MG: 2 INJECTION, SOLUTION INTRAMUSCULAR; INTRAVENOUS at 19:23

## 2025-03-09 RX ADMIN — KETOROLAC TROMETHAMINE 15 MG: 30 INJECTION, SOLUTION INTRAMUSCULAR at 19:23

## 2025-03-09 ASSESSMENT — COLUMBIA-SUICIDE SEVERITY RATING SCALE - C-SSRS
1. IN THE PAST MONTH, HAVE YOU WISHED YOU WERE DEAD OR WISHED YOU COULD GO TO SLEEP AND NOT WAKE UP?: NO
2. HAVE YOU ACTUALLY HAD ANY THOUGHTS OF KILLING YOURSELF?: NO
6. HAVE YOU EVER DONE ANYTHING, STARTED TO DO ANYTHING, OR PREPARED TO DO ANYTHING TO END YOUR LIFE?: NO

## 2025-03-09 ASSESSMENT — PAIN DESCRIPTION - PROGRESSION: CLINICAL_PROGRESSION: NOT CHANGED

## 2025-03-09 ASSESSMENT — PAIN SCALES - GENERAL
PAINLEVEL_OUTOF10: 6
PAINLEVEL_OUTOF10: 4

## 2025-03-09 ASSESSMENT — PAIN - FUNCTIONAL ASSESSMENT
PAIN_FUNCTIONAL_ASSESSMENT: 0-10
PAIN_FUNCTIONAL_ASSESSMENT: 0-10

## 2025-03-09 ASSESSMENT — PAIN DESCRIPTION - PAIN TYPE: TYPE: ACUTE PAIN

## 2025-03-09 ASSESSMENT — PAIN DESCRIPTION - ORIENTATION: ORIENTATION: LEFT

## 2025-03-09 ASSESSMENT — PAIN DESCRIPTION - FREQUENCY: FREQUENCY: CONSTANT/CONTINUOUS

## 2025-03-10 NOTE — DISCHARGE INSTRUCTIONS
Thank you for choosing [facility] Emergency Department and allowing me to take care of you today.     If your symptoms are not improving, begin to worsen, new symptoms develop/additional concerns arise, please return to the Emergency Department at any time for further evaluation and  treatment.     Dr. Francois Reilly Jr., D.O.     Follow-up with your primary care doctor or any emergency department in the next 3-5 days for re-evaluation and further treatment as deemed necessary call to make appointment with your urologist for expert evaluation further treatment is needed in the next 2 to 4 days           kidney stones that enter the ureter causing obstruction can be a very painful condition and unfortunately even with medications prescribed symptoms may return or worsen requiring return to emergency department for further evaluation treatment as necessary as it is not uncommon to need to return to emergency department in this setting     Due to the level of pain/discomfort you were prescribed a short course of Opioid medication, make sure at all times this medication is well secured, away from children, not in frequently accessed storage places, and not openly available in home to other persons. ONLY take medication for length of time and frequency as ABSOLUTELY needed due to known side effects and addictive potential, when no longer needed, properly dispose of or otherwise destroy   medication        Use 1000 mg acetaminophen with 400 mg ibuprofen every 6 hours as needed for pain for the next 5 days with other medications prescribed

## 2025-03-11 LAB — BACTERIA UR CULT: NO GROWTH

## 2025-03-21 DIAGNOSIS — E08.00 DIABETES MELLITUS DUE TO UNDERLYING CONDITION WITH HYPEROSMOLARITY WITHOUT COMA, WITHOUT LONG-TERM CURRENT USE OF INSULIN: ICD-10-CM

## 2025-03-21 PROCEDURE — RXMED WILLOW AMBULATORY MEDICATION CHARGE

## 2025-03-21 RX ORDER — TIRZEPATIDE 5 MG/.5ML
5 INJECTION, SOLUTION SUBCUTANEOUS WEEKLY
Qty: 2 ML | Refills: 0 | Status: SHIPPED | OUTPATIENT
Start: 2025-03-21

## 2025-03-21 NOTE — ED PROVIDER NOTES
EMERGENCY DEPARTMENT ENCOUNTER      Pt Name: Isabel Whitley  MRN: [id number]  Birthdate 1960  Date of evaluation: 3/9/2025  ED Provider: Francois Reilly DO     CHIEF COMPLAINT       Chief Complaint   Patient presents with    Flank Pain     Left sided flank pain and vomiting since this am, hx of kidney stones         HISTORY OF PRESENT ILLNESS   (Location/Symptom, Timing/Onset, Context/Setting, Quality, Duration, Modifying Factors, Severity) Note limiting factors.       HPI    Isabel Whitley is a 64 y.o. who presents to the emergency department with history of ureterolithiasis in the past for acute onset of left flank pain nausea and vomiting beginning this morning prior to arrival    Nursing Notes were reviewed.    History obtained from :  patient    Outside records reviewed: N/A      History/Collateral information obtained from: N/A     Chronic conditions affecting care:     Social determinants of health:    Patient's PDMP reviewed personally    ED Course as of 03/21/25 0548   Sun Mar 09, 2025   2103 Patient found to have obstructing ureterolithiasis no urinary symptoms [SL]      ED Course User Index  [SL] Francois Reilly DO         Diagnoses as of 03/21/25 0548   Ureterolithiasis   Flank pain        Discussion/ MDM:    All results/imaging if obtained reviewed and interpreted, results trended/compared with previous levels if available , Improved with medications provided in emergency department, patient afebrile, vital signs remain stable emergency department, no significant signs of lifethreatening infection, understands based on testing or imaging results discomfort seems to be consistent with renal colic or ureterolithiasis at this time however although it is unlikely it is not possible that other underlying more life threatening sources of abdominal pain may be present but currently not identified based on evaluation during this ED visit     Discussed all results obtained from imaging or labs  with patient and those present with patient's permission, provided opportunity to ask any further questions, all questions answered to the best of my ability, feels comfortable with discharge and plan to follow up with PCP and urologist as outpatient. Will return at any time if symptoms worsen, new symptoms develop, or any new concerns arise. chart created with voice recognition software, errors may be present due to software's interpretationPatient feeling much improved, understands and is agreeable with plan for treatment/symptomatic control and need for follow up, understands that this is difficult condition to predict future course of pain and symptoms, it is common that a level of pain may return that is not adequately able to be controlled with medications suggested, and may require return to emergency department, abdominal pain talk given, additional reasons for return discussed          REVIEW OF SYSTEMS     Review of Systems  Pertinent positives and negatives as per HPI    PAST MEDICAL HISTORY     Past Medical History:   Diagnosis Date    Pure hypercholesterolemia, unspecified 11/22/2013    Low-density-lipoid-type (LDL) hyperlipoproteinemia       SURGICAL HISTORY     History reviewed. No pertinent surgical history.    CURRENT MEDICATIONS       Discharge Medication List as of 3/9/2025  9:11 PM        CONTINUE these medications which have NOT CHANGED    Details   atorvastatin (Lipitor) 10 mg tablet TAKE 1 TABLET DAILY, Normal      cholecalciferol (Vitamin D-3) 5,000 Units tablet Take 1 tablet (5,000 Units) by mouth once daily., Starting Tue 2/25/2025, Normal      levothyroxine (Synthroid, Levoxyl) 50 mcg tablet Take 1 tablet (50 mcg) by mouth early in the morning.., Starting Tue 2/25/2025, Normal      losartan (Cozaar) 50 mg tablet Take 1 tablet (50 mg) by mouth once daily., Starting Tue 2/25/2025, Normal      metFORMIN (Glucophage) 1,000 mg tablet Take 1 tablet (1,000 mg) by mouth 2 times daily (morning and  late afternoon)., Starting Tue 2/25/2025, Normal      ondansetron (Zofran) 4 mg tablet Take 1 tablet (4 mg) by mouth every 8 hours if needed for nausea or vomiting., Starting Thu 11/7/2024, Normal      tirzepatide (Mounjaro) 5 mg/0.5 mL pen injector Inject 5 mg under the skin 1 (one) time per week., Starting Wed 12/18/2024, Normal             ALLERGIES     Patient has no known allergies.    FAMILY HISTORY       Family History   Problem Relation Name Age of Onset    Diabetes Other Family History     Heart disease Other Family History         SOCIAL HISTORY       Social History     Socioeconomic History    Marital status:    Tobacco Use    Smoking status: Never    Smokeless tobacco: Never   Substance and Sexual Activity    Alcohol use: Never       PHYSICAL EXAM       ED Triage Vitals [03/09/25 1824]   Temperature Heart Rate Respirations BP   36.5 °C (97.7 °F) 87 20 (!) 176/106      Pulse Ox Temp Source Heart Rate Source Patient Position   97 % Temporal Monitor Sitting      BP Location FiO2 (%)     Right arm --         Physical Exam  PHYSICIAL EXAM: Vitals reviewed  GENERAL:Awake and alert in room, appears to be in pain but nontoxic-appearing The patient appears nourished and normally developed. Vital signs as documented.     LUNGS: no increased work of breathing, no conversational dyspnea, maintaining adequate SPO2 on room air  ABDOMEN: Soft, non-tender, non-distended, no rebound/guarding, with no obvious masses     EXTREMITIES: No peripheral edema, with no obvious deformities.     SKIN: Good color, with no significant rashes. No pallor.     NEURO: No obvious neurological deficits, normal sensation and strength bilaterally. Patientable to ambulate with steady gait under own strength.     PSYCH: Mood and affect normal. Appropriate for age.  DIAGNOSTIC RESULTS     RADIOLOGY (Per Emergency Physician):     Interpretation per the Radiologist below, if available at the time of this note:  CT abdomen pelvis wo IV  contrast   Final Result   Cholelithiasis.  No features of acute cholecystitis.   Suspected 2 mm stone in the distal left ureter.  There is mild left   hydronephrosis and perinephric stranding.   Nonobstructing 2 mm stone lower pole right kidney.   Diverticulosis.  No features of acute diverticulitis.   Fat-containing umbilical hernia.   Signed by Edmond Hudson MD            LABS:  Labs Reviewed   COMPREHENSIVE METABOLIC PANEL - Abnormal       Result Value    Glucose 145 (*)     Sodium 139      Potassium 4.9      Chloride 106      Bicarbonate 22      Anion Gap 16      Urea Nitrogen 26 (*)     Creatinine 1.24 (*)     eGFR 48 (*)     Calcium 10.2      Albumin 4.2      Alkaline Phosphatase 58      Total Protein 7.2      AST 13      Bilirubin, Total 1.0      ALT 15     CBC WITH AUTO DIFFERENTIAL - Abnormal    WBC 11.3      nRBC 0.0      RBC 4.29      Hemoglobin 12.3      Hematocrit 38.5      MCV 90      MCH 28.7      MCHC 31.9 (*)     RDW 13.0      Platelets 352      Neutrophils % 89.5      Immature Granulocytes %, Automated 0.4      Lymphocytes % 7.5      Monocytes % 2.1      Eosinophils % 0.1      Basophils % 0.4      Neutrophils Absolute 10.08 (*)     Immature Granulocytes Absolute, Automated 0.05      Lymphocytes Absolute 0.85 (*)     Monocytes Absolute 0.24      Eosinophils Absolute 0.01      Basophils Absolute 0.04     URINALYSIS WITH REFLEX CULTURE AND MICROSCOPIC - Abnormal    Color, Urine Light-Orange (*)     Appearance, Urine Turbid (*)     Specific Gravity, Urine 1.025      pH, Urine 5.5      Protein, Urine 30 (1+) (*)     Glucose, Urine Normal      Blood, Urine OVER (3+) (*)     Ketones, Urine NEGATIVE      Bilirubin, Urine NEGATIVE      Urobilinogen, Urine Normal      Nitrite, Urine NEGATIVE      Leukocyte Esterase, Urine 25 Yolanda/uL (*)     Narrative:     OVER is reported when the result is greater than the clinically reportable range.   MICROSCOPIC ONLY, URINE - Abnormal    WBC, Urine 11-20 (*)      "RBC, Urine >20 (*)     Squamous Epithelial Cells, Urine 1-9 (SPARSE)      Bacteria, Urine 1+ (*)     Budding Yeast, Urine PRESENT (*)     Mucus, Urine 1+     URINE CULTURE - Normal    Urine Culture No growth     URINALYSIS WITH REFLEX CULTURE AND MICROSCOPIC    Narrative:     The following orders were created for panel order Urinalysis with Reflex Culture and Microscopic.  Procedure                               Abnormality         Status                     ---------                               -----------         ------                     Urinalysis with Reflex C...[[id number]]  Abnormal            Final result               Extra Urine Gray Tube[[id number]]                                                         Please view results for these tests on the individual orders.   EXTRA URINE GRAY TUBE       All other labs were within normal range or not returned as of this dictation.    EMERGENCY DEPARTMENT COURSE :   Vitals:    Vitals:    03/09/25 1824   BP: (!) 176/106   BP Location: Right arm   Patient Position: Sitting   Pulse: 87   Resp: 20   Temp: 36.5 °C (97.7 °F)   TempSrc: Temporal   SpO2: 97%   Weight: 131 kg (289 lb)   Height: 1.727 m (5' 8\")       Medications   sodium chloride 0.9 % bolus 1,000 mL (0 mL intravenous Stopped 3/9/25 2141)   morphine injection 4 mg (4 mg intravenous Given 3/9/25 1923)   ondansetron (Zofran) injection 4 mg (4 mg intravenous Given 3/9/25 1923)   ketorolac (Toradol) injection 15 mg (15 mg intravenous Given 3/9/25 1923)             PROCEDURES:  Unless otherwise noted below, none     Procedures    CRITICAL CARE TIME       FINAL IMPRESSION      1. Ureterolithiasis    2. Flank pain          DISPOSITION    Discharge 03/09/2025 09:07:25 PM    PATIENT REFERRED TO:  Mateo Gama MD  [address]  [facility], [address]  [place] [address]  [phone]            DISCHARGE MEDICATIONS:  Discharge Medication List as of 3/9/2025  9:11 PM        START taking these " medications    Details   oxyCODONE (Roxicodone) 5 mg immediate release tablet Take 1 tablet (5 mg) by mouth every 6 hours if needed for severe pain (7 - 10) for up to 10 doses., Starting Sun 3/9/2025, Print      ondansetron ODT (Zofran-ODT) 4 mg disintegrating tablet Dissolve 1 tablet (4 mg) in the mouth every 8 hours if needed for nausea or vomiting for up to 20 doses., Starting Sun 3/9/2025, Normal      tamsulosin (Flomax) 0.4 mg 24 hr capsule Take 1 capsule (0.4 mg) by mouth once daily for 4 doses., Starting Sun 3/9/2025, Until Thu 3/13/2025, Normal                (Comment: Please note this report has been produced using speech recognition software and may contain errors related to that system including errors in grammar, punctuation, and spelling, as well as words and phrases that may be inappropriate.  If there are any questions or concerns please feel free to contact the dictating provider for clarification.)    Francois Reilly DO (electronically signed)  Emergency Medicine Provider      Francois Reilly DO  03/21/25 1921

## 2025-03-25 ENCOUNTER — PHARMACY VISIT (OUTPATIENT)
Dept: PHARMACY | Facility: CLINIC | Age: 65
End: 2025-03-25
Payer: MEDICARE

## 2025-03-25 ENCOUNTER — HOSPITAL ENCOUNTER (OUTPATIENT)
Dept: RADIOLOGY | Facility: CLINIC | Age: 65
Discharge: HOME | End: 2025-03-25
Payer: COMMERCIAL

## 2025-03-25 DIAGNOSIS — M89.8X1 CLAVICLE PAIN: ICD-10-CM

## 2025-03-25 PROCEDURE — 73000 X-RAY EXAM OF COLLAR BONE: CPT | Mod: 50

## 2025-03-25 PROCEDURE — 73000 X-RAY EXAM OF COLLAR BONE: CPT | Mod: BILATERAL PROCEDURE | Performed by: RADIOLOGY

## 2025-03-29 ENCOUNTER — HOSPITAL ENCOUNTER (OUTPATIENT)
Dept: RADIOLOGY | Facility: HOSPITAL | Age: 65
Discharge: HOME | End: 2025-03-29
Payer: COMMERCIAL

## 2025-03-29 VITALS — BODY MASS INDEX: 41.37 KG/M2 | WEIGHT: 273 LBS | HEIGHT: 68 IN

## 2025-03-29 DIAGNOSIS — Z12.31 VISIT FOR SCREENING MAMMOGRAM: ICD-10-CM

## 2025-03-29 PROCEDURE — 77063 BREAST TOMOSYNTHESIS BI: CPT

## 2025-04-15 ENCOUNTER — APPOINTMENT (OUTPATIENT)
Dept: PRIMARY CARE | Facility: CLINIC | Age: 65
End: 2025-04-15
Payer: COMMERCIAL

## 2025-04-15 VITALS
SYSTOLIC BLOOD PRESSURE: 126 MMHG | BODY MASS INDEX: 41.37 KG/M2 | DIASTOLIC BLOOD PRESSURE: 74 MMHG | WEIGHT: 273 LBS | HEIGHT: 68 IN

## 2025-04-15 DIAGNOSIS — N20.0 KIDNEY STONE: ICD-10-CM

## 2025-04-15 DIAGNOSIS — E78.00 HIGH CHOLESTEROL: ICD-10-CM

## 2025-04-15 DIAGNOSIS — I10 HYPERTENSION, UNSPECIFIED TYPE: ICD-10-CM

## 2025-04-15 DIAGNOSIS — E08.00 DIABETES MELLITUS DUE TO UNDERLYING CONDITION WITH HYPEROSMOLARITY WITHOUT COMA, WITHOUT LONG-TERM CURRENT USE OF INSULIN: ICD-10-CM

## 2025-04-15 DIAGNOSIS — R22.1 NECK MASS: Primary | ICD-10-CM

## 2025-04-15 PROCEDURE — 1159F MED LIST DOCD IN RCRD: CPT | Performed by: INTERNAL MEDICINE

## 2025-04-15 PROCEDURE — 99214 OFFICE O/P EST MOD 30 MIN: CPT | Performed by: INTERNAL MEDICINE

## 2025-04-15 PROCEDURE — 3074F SYST BP LT 130 MM HG: CPT | Performed by: INTERNAL MEDICINE

## 2025-04-15 PROCEDURE — 4010F ACE/ARB THERAPY RXD/TAKEN: CPT | Performed by: INTERNAL MEDICINE

## 2025-04-15 PROCEDURE — RXMED WILLOW AMBULATORY MEDICATION CHARGE

## 2025-04-15 PROCEDURE — 3078F DIAST BP <80 MM HG: CPT | Performed by: INTERNAL MEDICINE

## 2025-04-15 PROCEDURE — 3008F BODY MASS INDEX DOCD: CPT | Performed by: INTERNAL MEDICINE

## 2025-04-15 RX ORDER — TIRZEPATIDE 5 MG/.5ML
5 INJECTION, SOLUTION SUBCUTANEOUS WEEKLY
Qty: 2 ML | Refills: 1 | Status: SHIPPED | OUTPATIENT
Start: 2025-04-15

## 2025-04-16 ENCOUNTER — PHARMACY VISIT (OUTPATIENT)
Dept: PHARMACY | Facility: CLINIC | Age: 65
End: 2025-04-16
Payer: MEDICARE

## 2025-04-16 NOTE — PROGRESS NOTES
"Subjective   Patient ID: Isabel Whitley is a 64 y.o. female who presents for Follow-up (Multiple issues).    Ms. Britt Whitley today came here for multiple issues.  1. The great news is this, she used her connections and got her Mounjaro pre-approved.  I am very happy for that.  2. Bad news is that she has a bad kidney stone.  She went to emergency room, stone passed.  She wants it checked.  3. Main issue today is her neck, she has a mass, her voice has changed.  She is not happy with it.  Right side of the neck mass is getting slightly bigger.  She is concerned with that and me too.  She came for follow-up on various conditions.    I have personally reviewed the patient's Past Medical History, Medications, Allergies, Social History, and Family History in the EMR.    Review of Systems   All other systems reviewed and are negative.    Objective   /74   Ht 1.727 m (5' 8\")   Wt 124 kg (273 lb)   BMI 41.51 kg/m²     Physical Exam  Vitals reviewed.   Neck:      Comments: Right side of the neck, lower down, there was a mass diffusely palpable, firm.  I could not see vocal cord moment.  No laryngoscope.  Cardiovascular:      Heart sounds: Normal heart sounds, S1 normal and S2 normal. No murmur heard.     No friction rub.   Pulmonary:      Effort: Pulmonary effort is normal.      Breath sounds: Normal breath sounds and air entry.   Abdominal:      Palpations: There is no hepatomegaly, splenomegaly or mass.   Musculoskeletal:      Right lower leg: No edema.      Left lower leg: No edema.   Lymphadenopathy:      Lower Body: No right inguinal adenopathy. No left inguinal adenopathy.   Neurological:      Cranial Nerves: Cranial nerves 2-12 are intact.      Sensory: No sensory deficit.      Motor: Motor function is intact.      Deep Tendon Reflexes: Reflexes are normal and symmetric.     LAB WORK: Laboratory testing discussed.    Assessment/Plan   Problem List Items Addressed This Visit           ICD-10-CM       " Cardiac and Vasculature    Hypertension I10     Other Visit Diagnoses         Codes      Neck mass    -  Primary R22.1    Relevant Orders    CT soft tissue neck w IV contrast    US thyroid    Referral to ENT      Diabetes mellitus due to underlying condition with hyperosmolarity without coma, without long-term current use of insulin     E08.00    Relevant Medications    tirzepatide (Mounjaro) 5 mg/0.5 mL pen injector      Kidney stone     N20.0      High cholesterol     E78.00        1. Neck mass, I am not sure thyroid versus soft tissue.  CT scan, ultrasound of thyroid ordered.  Referred to Dr. Cabezas for examination, probably excision biopsy to review at great length.  2. Type 2 diabetes and weight.  Mounjaro pre-approved.  We are happy.  3. Kidney stone, The patient passed it.  She is going to see a urologist soon.  4. Hypertension, okay.  5. Cholesterol, okay.  6. Follow-up with me in about three to four week’s time, but I will keep a very close eye on her progress with Dr. Cabezas.    Scribe Attestation  By signing my name below, I, Onofre Redmond attest that this documentation has been prepared under the direction and in the presence of Mateo Gama MD.     All medical record entries made by the paytonibsis were personally dictated by me I have reviewed the chart and agree the record accurately reflects my personal performance of his history physical examination and management

## 2025-04-24 LAB
ALBUMIN SERPL-MCNC: 4.1 G/DL (ref 3.6–5.1)
ALP SERPL-CCNC: 54 U/L (ref 37–153)
ALT SERPL-CCNC: 15 U/L (ref 6–29)
ANION GAP SERPL CALCULATED.4IONS-SCNC: 10 MMOL/L (CALC) (ref 7–17)
AST SERPL-CCNC: 16 U/L (ref 10–35)
BILIRUB SERPL-MCNC: 0.8 MG/DL (ref 0.2–1.2)
BUN SERPL-MCNC: 24 MG/DL (ref 7–25)
CALCIUM SERPL-MCNC: 9.7 MG/DL (ref 8.6–10.4)
CHLORIDE SERPL-SCNC: 105 MMOL/L (ref 98–110)
CHOLEST SERPL-MCNC: 121 MG/DL
CHOLEST/HDLC SERPL: 3 (CALC)
CO2 SERPL-SCNC: 24 MMOL/L (ref 20–32)
CREAT SERPL-MCNC: 1.04 MG/DL (ref 0.5–1.05)
EGFRCR SERPLBLD CKD-EPI 2021: 59 ML/MIN/1.73M2
EST. AVERAGE GLUCOSE BLD GHB EST-MCNC: 117 MG/DL
EST. AVERAGE GLUCOSE BLD GHB EST-SCNC: 6.5 MMOL/L
GLUCOSE SERPL-MCNC: 88 MG/DL (ref 65–99)
HBA1C MFR BLD: 5.7 %
HDLC SERPL-MCNC: 40 MG/DL
LDLC SERPL CALC-MCNC: 56 MG/DL (CALC)
NONHDLC SERPL-MCNC: 81 MG/DL (CALC)
POTASSIUM SERPL-SCNC: 4.5 MMOL/L (ref 3.5–5.3)
PROT SERPL-MCNC: 6.6 G/DL (ref 6.1–8.1)
SODIUM SERPL-SCNC: 139 MMOL/L (ref 135–146)
TRIGL SERPL-MCNC: 168 MG/DL
TSH SERPL-ACNC: 2.48 MIU/L (ref 0.4–4.5)

## 2025-04-29 ENCOUNTER — HOSPITAL ENCOUNTER (OUTPATIENT)
Dept: RADIOLOGY | Facility: CLINIC | Age: 65
End: 2025-04-29
Payer: COMMERCIAL

## 2025-04-29 ENCOUNTER — HOSPITAL ENCOUNTER (OUTPATIENT)
Dept: RADIOLOGY | Facility: CLINIC | Age: 65
Discharge: HOME | End: 2025-04-29
Payer: COMMERCIAL

## 2025-04-29 DIAGNOSIS — R22.1 NECK MASS: ICD-10-CM

## 2025-04-29 PROCEDURE — 70491 CT SOFT TISSUE NECK W/DYE: CPT

## 2025-04-29 PROCEDURE — 76536 US EXAM OF HEAD AND NECK: CPT | Performed by: RADIOLOGY

## 2025-04-29 PROCEDURE — 76536 US EXAM OF HEAD AND NECK: CPT

## 2025-04-29 PROCEDURE — 2550000001 HC RX 255 CONTRASTS: Mod: JZ | Performed by: INTERNAL MEDICINE

## 2025-04-29 PROCEDURE — 70491 CT SOFT TISSUE NECK W/DYE: CPT | Performed by: RADIOLOGY

## 2025-04-29 RX ADMIN — IOHEXOL 50 ML: 350 INJECTION, SOLUTION INTRAVENOUS at 11:50

## 2025-04-30 ENCOUNTER — HOSPITAL ENCOUNTER (EMERGENCY)
Facility: HOSPITAL | Age: 65
Discharge: HOME | End: 2025-04-30
Attending: STUDENT IN AN ORGANIZED HEALTH CARE EDUCATION/TRAINING PROGRAM
Payer: COMMERCIAL

## 2025-04-30 VITALS
OXYGEN SATURATION: 94 % | HEIGHT: 68 IN | HEART RATE: 85 BPM | WEIGHT: 270.5 LBS | DIASTOLIC BLOOD PRESSURE: 105 MMHG | RESPIRATION RATE: 18 BRPM | BODY MASS INDEX: 41 KG/M2 | SYSTOLIC BLOOD PRESSURE: 183 MMHG | TEMPERATURE: 97.3 F

## 2025-04-30 DIAGNOSIS — R04.0 ACUTE ANTERIOR EPISTAXIS: Primary | ICD-10-CM

## 2025-04-30 PROCEDURE — 2500000004 HC RX 250 GENERAL PHARMACY W/ HCPCS (ALT 636 FOR OP/ED): Performed by: STUDENT IN AN ORGANIZED HEALTH CARE EDUCATION/TRAINING PROGRAM

## 2025-04-30 PROCEDURE — 99283 EMERGENCY DEPT VISIT LOW MDM: CPT | Performed by: STUDENT IN AN ORGANIZED HEALTH CARE EDUCATION/TRAINING PROGRAM

## 2025-04-30 RX ORDER — ONDANSETRON 4 MG/1
4 TABLET, ORALLY DISINTEGRATING ORAL ONCE
Status: COMPLETED | OUTPATIENT
Start: 2025-04-30 | End: 2025-04-30

## 2025-04-30 RX ADMIN — ONDANSETRON 4 MG: 4 TABLET, ORALLY DISINTEGRATING ORAL at 08:22

## 2025-04-30 ASSESSMENT — PAIN - FUNCTIONAL ASSESSMENT: PAIN_FUNCTIONAL_ASSESSMENT: 0-10

## 2025-04-30 ASSESSMENT — COLUMBIA-SUICIDE SEVERITY RATING SCALE - C-SSRS
2. HAVE YOU ACTUALLY HAD ANY THOUGHTS OF KILLING YOURSELF?: NO
6. HAVE YOU EVER DONE ANYTHING, STARTED TO DO ANYTHING, OR PREPARED TO DO ANYTHING TO END YOUR LIFE?: NO
1. IN THE PAST MONTH, HAVE YOU WISHED YOU WERE DEAD OR WISHED YOU COULD GO TO SLEEP AND NOT WAKE UP?: NO

## 2025-04-30 ASSESSMENT — PAIN SCALES - GENERAL: PAINLEVEL_OUTOF10: 0 - NO PAIN

## 2025-04-30 NOTE — ED PROVIDER NOTES
"HPI   Chief Complaint   Patient presents with    Epistaxis (Nose Bleed)     About a 1/2 hr ago my nose started bleeding and when I clamp it the blood comes out my eyes        Patient presents ED for acute onset epistaxis about 1 hour prior to arrival.  Endorses mild to moderate bleeding.  Notes no AC/AP medications other than ASA 81 mg.  Denies any recent trauma.  Has a follow-up ENT appointment next week for establish care of soft tissue mass of neck.  She has never experienced nosebleeds before.  Endorses mild nausea.  Denies any other significant systemic symptoms or complaints.              Patient History   Medical History[1]  Surgical History[2]  Family History[3]  Social History[4]    Physical Exam   Blood pressure (!) 183/105, pulse 85, temperature 36.3 °C (97.3 °F), temperature source Temporal, resp. rate 18, height 1.727 m (5' 8\"), weight 123 kg (270 lb 8.1 oz), SpO2 94%.      Physical Exam  Vitals and nursing note reviewed.   Constitutional:       General: She is not in acute distress.     Appearance: Normal appearance. She is normal weight. She is not ill-appearing.   HENT:      Nose:      Right Nostril: Epistaxis present.      Left Nostril: Epistaxis present.      Comments: Significant blood clots in bilateral nares, no identifiable active anterior hemorrhage, bleeding appears to have significantly slowed     Mouth/Throat:      Mouth: Mucous membranes are moist.      Pharynx: Oropharynx is clear.      Comments: Minimal posterior oropharyngeal bleeding but no significant hemorrhage concerning for posterior epistaxis  Eyes:      General: No scleral icterus.     Pupils: Pupils are equal, round, and reactive to light.   Cardiovascular:      Rate and Rhythm: Normal rate.   Pulmonary:      Effort: Pulmonary effort is normal.   Skin:     General: Skin is warm and dry.   Neurological:      General: No focal deficit present.      Mental Status: She is alert and oriented to person, place, and time.           ED " Course & MDM   ED Course as of 05/03/25 1554   Wed Apr 30, 2025   0818 VS notable for significant hypertensive on presentation in setting of epistaxis, remaining VSS [BC]      ED Course User Index  [BC] Rio Hamilton MD         Diagnoses as of 05/03/25 1554   Acute anterior epistaxis                 No data recorded     Nidhi Coma Scale Score: 15 (04/30/25 0809 : Elida Tillman, EMMA)                           Medical Decision Making  Patient presented to the ED for acute onset epistaxis with concerning PMHx of HTN, HLD, hypothyroidism.  I personally reviewed and interpreted VS which are as stated above in the ED course.    Assessment/evaluation consistent with anterior R nare epistaxis likely Kirsten/Keisselbach vascular plexus.  No concerning history, clinical evidence/work-up, or exam findings for the considered differentials of posterior epistaxis, coagulopathy/anticoagulated,.  These conditions have been thoroughly evaluated and determined to be sufficiently unlikely to be the etiology of patient's presenting symptoms.    After receiving an appropriate exam, clinical work-up, and necessary interventions/treatment, Patient is appropriate for discharge at this time due to no concerning symptoms or findings requiring hospitalization for stabilization or further interrogation/management and is appropriate for management of symptoms at home with recommended appropriate outpatient follow-up.  Patient was encouraged to ask any questions or for clarification of today's ED encounter.  Patient is agreeable to plan of care. Discussed with Patient today's results/findings and likely diagnosis, provided appropriate RTED precautions along with recommended follow-up with PCP and Otolaryngologist/Otolaryngology  (ENT).      Per Chart Review: Nothing pertinent to this ED encounter.      Parts of this chart have been completed using voice-to-text recognition software. Please excuse any errors of transcription that were  missed for editing/correcting.        Procedure  Procedures       [1]   Past Medical History:  Diagnosis Date    Breast cyst 08012012    Pure hypercholesterolemia, unspecified 11/22/2013    Low-density-lipoid-type (LDL) hyperlipoproteinemia   [2]   Past Surgical History:  Procedure Laterality Date    BREAST BIOPSY  08162012    BREAST LUMPECTOMY  08162012    HYSTERECTOMY  [id number]   [3]   Family History  Problem Relation Name Age of Onset    Diabetes Other Family History     Heart disease Other Family History    [4]   Social History  Tobacco Use    Smoking status: Never    Smokeless tobacco: Never   Substance Use Topics    Alcohol use: Never    Drug use: Not on file        Rio Hamilton MD  05/03/25 5500

## 2025-04-30 NOTE — DISCHARGE INSTRUCTIONS
Thank you for allowing myself and the team to take care of you during your emergency situation and addressing your health concerns.    You were evaluated for bloody nose.     Your likely condition/diagnosis: Bloody nose from anterior vessels    During your visit in the emergency department you were evaluated for your presenting symptoms.  Based on the extensive workup you received,  all efforts were made to identify the source of your symptoms and identify any life-threatening conditions.  These life-threatening conditions that were attempted to be identified and medically managed/treated include but not limited to posterior bleeding of nose.  Additionally, you may be experiencing symptoms that are non-life-threatening but are uncomfortable and disruptive to your everyday life.  All efforts were made to manage your symptoms while in the emergency department along with appropriate at home therapy for symptomatic management during your recovery. Please be aware that not all of the conditions explained/discussed in these discharge instructions are applicable to your condition but encompass many of the conditions that were evaluated for during your ED encounter.      During your evaluation and assessment, all measures were taken to evaluate you and address your health concerns to identify dangerous and life threatening health conditions. It is not possible to identify all health conditions or pathologies and eliminate any chance of unfavorable outcomes while in the Emergency Department. My team encourages you to be vigilant with your health and follow-up with the appropriate providers outlined in your discharge paperwork. Please return to the Emergency Department if you feel that your health has not improved or experiencing worsening symptoms.    Special instructions please make a follow-up appoint with your primary care physician to further manage symptoms address your health concerns.    Incidental findings:  None

## 2025-05-06 ENCOUNTER — APPOINTMENT (OUTPATIENT)
Dept: OTOLARYNGOLOGY | Facility: CLINIC | Age: 65
End: 2025-05-06
Payer: COMMERCIAL

## 2025-05-06 VITALS — HEIGHT: 68 IN | BODY MASS INDEX: 41.37 KG/M2 | WEIGHT: 273 LBS

## 2025-05-06 DIAGNOSIS — R04.0 EPISTAXIS: Primary | ICD-10-CM

## 2025-05-06 DIAGNOSIS — R22.1 NECK MASS: ICD-10-CM

## 2025-05-06 DIAGNOSIS — R49.0 HOARSENESS: ICD-10-CM

## 2025-05-06 PROCEDURE — 1160F RVW MEDS BY RX/DR IN RCRD: CPT | Performed by: OTOLARYNGOLOGY

## 2025-05-06 PROCEDURE — 3008F BODY MASS INDEX DOCD: CPT | Performed by: OTOLARYNGOLOGY

## 2025-05-06 PROCEDURE — 1159F MED LIST DOCD IN RCRD: CPT | Performed by: OTOLARYNGOLOGY

## 2025-05-06 PROCEDURE — 99204 OFFICE O/P NEW MOD 45 MIN: CPT | Performed by: OTOLARYNGOLOGY

## 2025-05-06 PROCEDURE — 31575 DIAGNOSTIC LARYNGOSCOPY: CPT | Performed by: OTOLARYNGOLOGY

## 2025-05-06 ASSESSMENT — PATIENT HEALTH QUESTIONNAIRE - PHQ9
SUM OF ALL RESPONSES TO PHQ9 QUESTIONS 1 AND 2: 0
1. LITTLE INTEREST OR PLEASURE IN DOING THINGS: NOT AT ALL
2. FEELING DOWN, DEPRESSED OR HOPELESS: NOT AT ALL

## 2025-05-06 NOTE — PROGRESS NOTES
ENT New Patient Clinic Note  Name: Britt Whitley  MRN: [id number]  : 1960  CC: R neck mass  Referred by: Mateo Gama MD     History of Present Illness  Britt Whitley is a 64 y.o. female who presents to clinic today for initial evaluation of R Neck mass. Patient reports noticing it several months ago after a period of intentional weight loss wherein she lost nearly 80 pounds. Recent CT and thyroid ultrasound performed were both unremarkable. Patient also reports having hoarseness, which she began experiencing several months ago prior to her weight loss. She reports having had reflux previously but this has resolved since she began losing weight. She does endorse some increased rhinorrhea but no significant postnasal drip. Also of note, patient had a recent episode of severe epistaxis form the R nares necessitating ED visit. R septum was cauterized with AgNO3. Patient has had previous instances of epistaxis but nothing this severe requiring ED visit.       No other complaints or pertinent history at this time.    Review of Systems  All other systems have been reviewed and are negative for complaint. I personally reviewed the intake form that was scanned in today    Past Medical History  Medical History[1]    Past Surgical History  Surgical History[2]    Medications  Medications Ordered Prior to Encounter[3]    Allergies  Allergies[4]    Social History  Social History[5]    Family History  Family History[6]    Physical Examination  Vitals: There were no vitals taken for this visit.  CONSTITUTIONAL:  Vitals reviewed from intake field, well developed, well nourished.    RESPIRATION:  Breathing comfortably, no stridor.  CV:  No clubbing/cyanosis/edema in hands.  EYES:  EOM Intact, sclera normal.  NEURO:  Alert and oriented times 3, Cranial nerves grossly intact and symmetric bilaterally.  HEAD AND FACE:  Symmetric facial features,  no masses or lesions, sinuses nontender to palpation.  SALIVARY  GLANDS:  Parotid and submandibular glands normal bilaterally.  EARS:  Normal external ears, external auditory canals, and TMs to otoscopy, normal hearing voice.  NOSE:  External nose midline, anterior rhinoscopy is normal with limited visualization to the anterior aspect of the inferior turbinates.  Small area of prominent erythema posterior to previous site of cautery.  ORAL CAVITY/OROPHARYNX/LIPS:  Normal mucous membranes, normal floor of mouth/tongue/OP, no masses or lesions are noted.  PHARYNGEAL WALLS AND NASOPHARYNX:  No masses noted. Mucosa appears clean and moist  NECK/LYMPH:  No LAD, no thyroid masses. Trachea palpably midline, significant soft tissue redundancy, particularly at R supraclavicular region  SKIN:  Neck skin is without scar or injury  PSYCH:  Alert and oriented with appropriate mood and affect    Imaging  I personally reviewed the CT soft tissue neck from 4/29 and this is my impression   - No discernable masses or lesions    Procedure Note: Flexible Nasolaryngoscopy  Verbal informed consent was obtained from the patient/patient's guardian. 4% lidocaine mixed with phenylephrine was prepared and dripped into the nose. It was placed in the R naris. Following an appropriate amount of time to allow for adequate anesthesia, a flexible fiberoptic nasolaryngoscope was placed into the patient's R naris. The nasal cavity, nasopharynx, oropharynx, hypopharynx, and all endolaryngeal structures were visualized and were normal except as listed below. Significant findings included:  - No mucosal abnormalities, masses, or lesions appreciated  - Bilateral vocal fold mobile, widely patent airway, patient does not have full glottic closure on phonation  - No pooling of secretions      Other Notes Reviewed  I personally reviewed the consultant notes or primary team notes.    Impression  Britt Whitley is a 64 y.o. female who presents to ENT clinic today for evaluation of possible neck mass, hoarseness, and R  sided epistaxis. Neck appears to have significant soft tissue redundancy that's particularly noticeable after weight loss. No concerns for a discreet mass. Some issues with glottic closure on phonation. Septal mucosa appears dry and erythematous. Can consider further cautery if it doesn't resolve with conservative management    Plan  - Recommended SLP for management of glottic insufficiency  -  Recommend nasal saline and ayr gel for better hydration of nares, can consider chemical cautery at follow-up if no improvement  - 4 week follow-up     Aramis Chacko MD - PGY1  Otolaryngology - Head and Neck Surgery  Head & Neck team phone: [phone]  ENT consult pager: [phone]   Pediatric ENT pager: [phone]  ENT Outpatient scheduling number: [phone]       The above resident note has been reviewed and confirmed.  Patient was seen and examined with the resident today.  Documentation has been reviewed.        Will see her back for the epistaxis                     [1]   Past Medical History:  Diagnosis Date    Breast cyst 08012012    Pure hypercholesterolemia, unspecified 11/22/2013    Low-density-lipoid-type (LDL) hyperlipoproteinemia   [2]   Past Surgical History:  Procedure Laterality Date    BREAST BIOPSY  08162012    BREAST LUMPECTOMY  08162012    HYSTERECTOMY  07011993   [3]   Current Outpatient Medications on File Prior to Visit   Medication Sig Dispense Refill    atorvastatin (Lipitor) 10 mg tablet TAKE 1 TABLET DAILY 90 tablet 3    cholecalciferol (Vitamin D-3) 5,000 Units tablet Take 1 tablet (5,000 Units) by mouth once daily. 90 tablet 0    levothyroxine (Synthroid, Levoxyl) 50 mcg tablet Take 1 tablet (50 mcg) by mouth early in the morning.. 90 tablet 1    losartan (Cozaar) 50 mg tablet Take 1 tablet (50 mg) by mouth once daily. 90 tablet 0    metFORMIN (Glucophage) 1,000 mg tablet TAKE 1 TABLET (1,000 MG) BY MOUTH 2 TIMES DAILY (MORNING AND LATE AFTERNOON). 180 tablet 0    ondansetron ODT (Zofran-ODT) 4 mg  disintegrating tablet Dissolve 1 tablet (4 mg) in the mouth every 8 hours if needed for nausea or vomiting for up to 20 doses. 20 tablet 0    tirzepatide (Mounjaro) 5 mg/0.5 mL pen injector Inject 5 mg under the skin 1 (one) time per week. 2 mL 1     No current facility-administered medications on file prior to visit.   [4] No Known Allergies  [5]   Social History  Tobacco Use    Smoking status: Never    Smokeless tobacco: Never   Substance Use Topics    Alcohol use: Never   [6]   Family History  Problem Relation Name Age of Onset    Diabetes Other Family History     Heart disease Other Family History

## 2025-05-13 ENCOUNTER — APPOINTMENT (OUTPATIENT)
Dept: PRIMARY CARE | Facility: CLINIC | Age: 65
End: 2025-05-13
Payer: COMMERCIAL

## 2025-05-13 PROCEDURE — RXMED WILLOW AMBULATORY MEDICATION CHARGE

## 2025-05-14 ENCOUNTER — PHARMACY VISIT (OUTPATIENT)
Dept: PHARMACY | Facility: CLINIC | Age: 65
End: 2025-05-14
Payer: MEDICARE

## 2025-05-20 ENCOUNTER — APPOINTMENT (OUTPATIENT)
Dept: PRIMARY CARE | Facility: CLINIC | Age: 65
End: 2025-05-20
Payer: COMMERCIAL

## 2025-05-20 VITALS
BODY MASS INDEX: 40.83 KG/M2 | SYSTOLIC BLOOD PRESSURE: 146 MMHG | WEIGHT: 269.4 LBS | DIASTOLIC BLOOD PRESSURE: 80 MMHG | HEIGHT: 68 IN

## 2025-05-20 DIAGNOSIS — R04.0 NASAL BLEEDING: ICD-10-CM

## 2025-05-20 DIAGNOSIS — E08.00 DIABETES MELLITUS DUE TO UNDERLYING CONDITION WITH HYPEROSMOLARITY WITHOUT COMA, WITHOUT LONG-TERM CURRENT USE OF INSULIN: ICD-10-CM

## 2025-05-20 DIAGNOSIS — E78.00 HIGH CHOLESTEROL: ICD-10-CM

## 2025-05-20 DIAGNOSIS — R22.2 LUMP IN CHEST: Primary | ICD-10-CM

## 2025-05-20 DIAGNOSIS — I10 HYPERTENSION, UNSPECIFIED TYPE: ICD-10-CM

## 2025-05-20 PROCEDURE — 3008F BODY MASS INDEX DOCD: CPT | Performed by: INTERNAL MEDICINE

## 2025-05-20 PROCEDURE — 99214 OFFICE O/P EST MOD 30 MIN: CPT | Performed by: INTERNAL MEDICINE

## 2025-05-20 PROCEDURE — 3079F DIAST BP 80-89 MM HG: CPT | Performed by: INTERNAL MEDICINE

## 2025-05-20 PROCEDURE — 1159F MED LIST DOCD IN RCRD: CPT | Performed by: INTERNAL MEDICINE

## 2025-05-20 PROCEDURE — 4010F ACE/ARB THERAPY RXD/TAKEN: CPT | Performed by: INTERNAL MEDICINE

## 2025-05-20 PROCEDURE — 3077F SYST BP >= 140 MM HG: CPT | Performed by: INTERNAL MEDICINE

## 2025-05-20 RX ORDER — TIRZEPATIDE 5 MG/.5ML
5 INJECTION, SOLUTION SUBCUTANEOUS WEEKLY
Qty: 6 ML | Refills: 1 | Status: SHIPPED | OUTPATIENT
Start: 2025-05-20 | End: 2025-08-12

## 2025-05-20 NOTE — PROGRESS NOTES
"Subjective   Patient ID: Isabel Whitley is a 64 y.o. female who presents for Follow-up (on various conditions./).    1. It is always a delight to serve Ms. Whitley.  She came for follow-up on various conditions.  2. 5 mg of Mounjaro doing world of good.  83 pounds lost in about seven months, great progress.  Doing good.  No side effects.  3. She informed me she saw ENT physician.  Things are okay.  She got cauterization.  4. The lump on front of the chest wall is probably fatty lump.  She involved me.  We will just monitor.  5. Overall, doing good.  No problem.    I have personally reviewed the patient's Past Medical History, Medications, Allergies, Social History, and Family History in the EMR.    Review of Systems   All other systems reviewed and are negative.    Objective   /80   Ht 1.727 m (5' 8\")   Wt 122 kg (269 lb 6.4 oz)   BMI 40.96 kg/m²     Physical Exam  Vitals reviewed.   Cardiovascular:      Heart sounds: Normal heart sounds, S1 normal and S2 normal. No murmur heard.     No friction rub.   Pulmonary:      Effort: Pulmonary effort is normal.      Breath sounds: Normal breath sounds and air entry.   Chest:      Comments: Chest wall lump looks okay.  Abdominal:      Palpations: There is no hepatomegaly, splenomegaly or mass.   Musculoskeletal:      Right lower leg: No edema.      Left lower leg: No edema.   Lymphadenopathy:      Lower Body: No right inguinal adenopathy. No left inguinal adenopathy.   Neurological:      Cranial Nerves: Cranial nerves 2-12 are intact.      Sensory: No sensory deficit.      Motor: Motor function is intact.      Deep Tendon Reflexes: Reflexes are normal and symmetric.     LAB WORK: Laboratory testing discussed.    Assessment/Plan   Problem List Items Addressed This Visit           ICD-10-CM    Hypertension I10     Other Visit Diagnoses         Codes      Lump in chest    -  Primary R22.2      Diabetes mellitus due to underlying condition with hyperosmolarity without " coma, without long-term current use of insulin     E08.00    Relevant Medications    tirzepatide (Mounjaro) 5 mg/0.5 mL pen injector    Other Relevant Orders    Comprehensive Metabolic Panel    Thyroid Stimulating Hormone    Lipid Panel    Hemoglobin A1C      Nasal bleeding     R04.0      High cholesterol     E78.00        1. Chest wall lump benign, keep an eye.  2. The vocal cord issue.  She is going for therapy.  3. Nasal bleed.  She was in ER.  Things stable.  She got cauterization.  Monitor.  4. Type 2 diabetes and weight loss, what a wonderful progress.  Blood work end of July with repeat blood tests.  5. Hypertension, okay.  6. Cholesterol, stable.  7. Regular with the Pap test.  Mammogram perfect.  8. Follow-up appointment end of July after blood work.  9.  Since Mounjaro is her constant dose, we will do 90 days.  See if insurance covers.    Scribe Attestation  By signing my name below, IKate Scribe attest that this documentation has been prepared under the direction and in the presence of Mateo Gama MD.     All medical record entries made by the scribe were personally dictated by me I have reviewed the chart and agree the record accurately reflects my personal performance of his history physical examination and management

## 2025-05-29 ENCOUNTER — APPOINTMENT (OUTPATIENT)
Dept: OTOLARYNGOLOGY | Facility: CLINIC | Age: 65
End: 2025-05-29
Payer: COMMERCIAL

## 2025-05-29 VITALS — HEIGHT: 68 IN | BODY MASS INDEX: 41.37 KG/M2 | TEMPERATURE: 98.6 F | WEIGHT: 273 LBS

## 2025-05-29 DIAGNOSIS — R49.0 DYSPHONIA: Primary | ICD-10-CM

## 2025-05-29 DIAGNOSIS — J38.7 PRESBYLARYNGES: ICD-10-CM

## 2025-05-29 DIAGNOSIS — J38.3 GLOTTIC INSUFFICIENCY: ICD-10-CM

## 2025-05-29 PROCEDURE — 1036F TOBACCO NON-USER: CPT

## 2025-05-29 PROCEDURE — 3008F BODY MASS INDEX DOCD: CPT

## 2025-05-29 PROCEDURE — 1159F MED LIST DOCD IN RCRD: CPT

## 2025-05-29 PROCEDURE — 1160F RVW MEDS BY RX/DR IN RCRD: CPT

## 2025-05-29 PROCEDURE — 31579 LARYNGOSCOPY TELESCOPIC: CPT

## 2025-05-29 PROCEDURE — 99214 OFFICE O/P EST MOD 30 MIN: CPT

## 2025-05-29 ASSESSMENT — PATIENT HEALTH QUESTIONNAIRE - PHQ9
1. LITTLE INTEREST OR PLEASURE IN DOING THINGS: NOT AT ALL
SUM OF ALL RESPONSES TO PHQ9 QUESTIONS 1 AND 2: 0
2. FEELING DOWN, DEPRESSED OR HOPELESS: NOT AT ALL

## 2025-05-29 NOTE — PROGRESS NOTES
"Reason For Consult  Chief Complaint   Patient presents with    Hoarseness        HISTORY OF PRESENT ILLNESS:  Britt Whitley \"Isabel\", who is a 64 y.o. female presenting for an initial visit for hoarseness of voice.  The patient reports that the hoarseness has been occurring over the past year and has worsened over the past few months. The patient reports high voice demand through work and will experience vocal fatigue and worsening of hoarseness with increased talking. Patient reports hx of acid reflux, but has not experienced reflux recently with weight loss. Patient reports breathing is stable. Patient denies any issues with swallowing solids, liquids, and pills.      Past Medical History  She has a past medical history of Breast cyst (08012012) and Pure hypercholesterolemia, unspecified (11/22/2013).    She has no past medical history of Personal history of irradiation.  Surgical History  She has a past surgical history that includes Breast lumpectomy (08162012); Breast biopsy (08162012); and Hysterectomy (07011993).     Social History  She reports that she has never smoked. She has never used smokeless tobacco. She reports that she does not drink alcohol. No history on file for drug use.    Allergies  Patient has no known allergies.    Review of Systems  All 10 systems were reviewed and negative except for above.      Physical Exam  CONSTITUTIONAL: Well developed, well nourished.    VOICE: moderate hoarseness and breathiness  RESPIRATION: Breathing comfortably, no stridor.    NEURO: Alert and oriented x3, cranial nerves II-XII intact and symmetric bilaterally.    EARS: Normal external ears, external auditory canals, normal hearing to conversational voice.    NOSE: External nose midline, anterior rhinoscopy is normal with limited visualization to the anterior aspect of the interior turbinates. No lesions noted.     ORAL CAVITY/OROPHARYNX/LIPS: Normal mucous membranes, normal floor of mouth/tongue/OP, no " "masses or lesions are noted.    SKIN: Neck skin is intact.    PSYCH: Alert and oriented with appropriate mood and affect.        Last Recorded Vitals  Temperature 37 °C (98.6 °F), temperature source Temporal, height 1.727 m (5' 8\"), weight 124 kg (273 lb).    Procedure  PROCEDURE NOTE:  Recommended flexible laryngoscopy/stroboscopy.  Risks, benefits,  and alternatives were explained.  They wish to proceed and provide verbal consent.     PROCEDURE:  laryngoscopy with stroboscopy, CPT 00011     POSTPROCEDURE DIAGNOSIS: glottic insufficiency, Presbylarynges    INDICATIONS: Inability to tolerate mirror exam or abnormal findings on mirror, Flexible Laryngoscopy/Stroboscopy performed to assess one of the followin. Diagnosis of symptomatic disorder involving the voice, swallow, upper aerodigestive tract, including GIANFRANCO disorders, or  2. Preoperative evaluation of vocal cord function for individuals undergoing surgery where the RLN or vagus nerves are at risk of injury, or  3. Further evaluation of abnormalities of the upper aerodigestive tract discovered by another modality, such as CT, MRI, bronchoscopy or EGD    Description of Procedure:    After adequate afrin and lidocaine spray, I advanced the endoscope.  Visualization of the nasopharynx, vallecula, posterior pharyngeal walls, pyriform, epiglottis and post cricoid areas was unremarkable.  The following laryngeal findings were noted:    vocal cord movement was mild right side vc weakness, left normal.   closure was incomplete  Compression was increased AP < FVC (L>R)  edema was none  lesions were none  the subglottis was widely patent  Pharyngeal wall squeeze was normal    Procedure well tolerated.       ASSESSMENT AND PLAN:   This is an initial visit for dysphonia with clinical findings notable for presbylarynges and glottic insufficiency. Patient has incomplete gap during phonation.  Discussed option of vocal cord injection. Patient would like to start with voice " therapy and can discuss vc injection further at follow up if dysphonia still persistent. Patient agreeable to plan. All questions answered.

## 2025-06-06 PROCEDURE — RXMED WILLOW AMBULATORY MEDICATION CHARGE

## 2025-06-09 ENCOUNTER — APPOINTMENT (OUTPATIENT)
Dept: SPEECH THERAPY | Facility: CLINIC | Age: 65
End: 2025-06-09
Payer: COMMERCIAL

## 2025-06-09 ENCOUNTER — PHARMACY VISIT (OUTPATIENT)
Dept: PHARMACY | Facility: CLINIC | Age: 65
End: 2025-06-09
Payer: MEDICARE

## 2025-06-10 ENCOUNTER — EVALUATION (OUTPATIENT)
Dept: SPEECH THERAPY | Facility: CLINIC | Age: 65
End: 2025-06-10
Payer: COMMERCIAL

## 2025-06-10 DIAGNOSIS — J38.02 BILATERAL VOCAL CORD PARESIS: Primary | ICD-10-CM

## 2025-06-10 DIAGNOSIS — R49.0 DYSPHONIA: ICD-10-CM

## 2025-06-10 DIAGNOSIS — R49.0 HOARSENESS: ICD-10-CM

## 2025-06-10 PROCEDURE — 92524 BEHAVRAL QUALIT ANALYS VOICE: CPT | Mod: GN

## 2025-06-10 ASSESSMENT — PAIN SCALES - GENERAL: PAINLEVEL_OUTOF10: 0 - NO PAIN

## 2025-06-10 ASSESSMENT — PAIN - FUNCTIONAL ASSESSMENT: PAIN_FUNCTIONAL_ASSESSMENT: 0-10

## 2025-06-10 NOTE — PROGRESS NOTES
"Speech-Language Pathology    Voice Evaluation    Patient Name: Britt Whitley \"Saad"  MRN: [id number]  Today's Date: 6/10/2025     Time Calculation  Start Time: 1440  Stop Time: 1540  Time Calculation (min): 60 min      Current Problem:  Problem List[1]    SUBJECTIVE  Patient alert and oriented and ready to participate in office visit this date. Accompanied by spouse.     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 Patient appears to be an excellent candidate for therapy which will target laryngeal hygiene and voice rebalancing.    Therapy initiated this date. Details documented below.     CONTRIBUTING FACTORS  Supraglottic compression/MTD  Decreased neuromuscular control of speech muscles  Habitual behaviors that misuse/abuse the voice    HISTORY/REASON FOR REFERRAL  Isabel Whitley is a 64 y.o. female referred by Ronak Kelly CNP for hoarseness of voice.  The patient reports that the hoarseness has been occurring over the past year and has worsened over the past few months. She has high professional voice demands and will experience vocal fatigue and worsening of hoarseness with increased talking. 3 days of week 80-90% voice use and 2 days of week 20%.     Patient reports hx of acid reflux, but has not experienced reflux recently with weight loss. Patient reports breathing is stable. Patient denies any issues with swallowing solids, liquids, and pills.      ASSESSMENT  Pain Assessment:  Pain Assessment  Pain Assessment: 0-10  0-10 (Numeric) Pain Score: 0 - No pain    GRBAS:  ndGndrndanddndend:nd nd2nd Roughness: 1  Breathiness: 1  Asthenia: 0  Strain: 1    VHI-10:  My voice makes it difficult for people to hear me.: Almost always  People have difficulty understanding me in a noisy room.: Almost always  My voice difficulties restrict my personal and social life.: Almost never  I feel left out of the conversations because of my voice: Never  My " "voice problem causes me to lose income.: Never  I feel as though I have to strain to produce voice.: Always  The clarity of my voice is unpredictable.: Always  My voice problem upsets me.: Sometimes  My voice makes me feel handicapped.: Never  People ask, \"What's wrong with your voice?\": Always  VHI-10 Total Score: 21    PERCEPTUAL VOICE FEATURES  Intonation: WFL  Loudness: WFL; reduced ability to project without strain  Nasal resonance: WFL    Additional vocal characteristics noted included:  Glottal weinberg  Throat clears    FLEXIBLE LARYNGOSCOPY WITH STROBOSCOPY (Bartch, CNP)  Vocal fold mobility: normal; right falling out before the left  Glottic closure: incomplete with mild mid glottal gap  Mucosal wave: reduced bilaterally 2/2 MTD  Secretions: no pooling  Supraglottic compression: AP < FVC (L > R)  Vascularity: mild bilateral vocal cord edema  Mucosal edge: no lesions  Interarytenoid edema: none  Subglottis: widely patent  Pharyngeal contraction: normal    TREATMENT  Stimulability testing completed via SOVT exercises and Clear Speech technique. Based on outcome, initiated voice retraining to improve diaphragmatic breath support and unload supraglottic compression associated with speech via RVT cup bubbles, lip/tongue trilling and humming with a lingual anchor > open throat posture. Multimodality cueing, instruction in proper posture and repeated trials improved patient performance accuracy. Carryover to speaking voice for several phrases following completion. Encouraged home practice several times a day for limited duration.     Introduced CTT training as well targeting increased breath support at the phrase level.     Clinician modeled all techniques and accurate patient follow through confirmed.  Handouts provided to facilitate optimal home carryover.     PLAN OF CARE  Frequency: 1x/eowk  Duration:  3 months    LONG TERM GOALS (6/10/25 to 9/9/25)  Improve overall vocal health to foster increased participation " levels at home, work and in the community environment.  Patient will demonstrate reduction in symptoms as evidenced by improved scores on VHI-10, RSI, and/or CSI following treatment.    SHORT TERM GOALS (6/10/25 to 9/9/25)  Patient will increase vocal wellness and decrease phono trauma in adherence with clinician prescribed vocal hygiene and wellness program per patient report 80% of his/her day.  Patient will increase ability to produce voice without tension within 5 minute conversational task x 80% accuracy as judged by clinician observation and/or patient report.  Patient will demonstrate independent use of voice techniques x 80% accuracy.  Patient will increase the balance of the respiratory/laryngeal musculature x 80% accuracy.    RECOMMENDATIONS FOR THERAPEUTIC INTERVENTIONS  Speech/voice exercises  Vocal hygiene program    POTENTIAL FOR IMPROVEMENT  Good    FACTORS AFFECTING PROGNOSIS  None    DISCUSSED PLAN OF CARE WITH patient  DISCUSSED RISK/BENEFITS WITH patient  PATIENT/CAREGIVER AGREEABLE WITH PLAN.        [1]   Patient Active Problem List  Diagnosis    Acute sinusitis    Age-related nuclear cataract of both eyes    Anisometropia    Bilateral presbyopia    Cough    Fatigue    Hyperlipidemia    Hypertension    Insomnia    Hypothyroidism    Joint pain, knee    Lower back pain    UTI (urinary tract infection)    Vitamin D deficiency    Edema of foot    Nuclear senile cataract    Sprain of foot    Neck mass    Epistaxis    Hoarseness    Bilateral vocal cord paresis

## 2025-06-25 ENCOUNTER — TELEMEDICINE CLINICAL SUPPORT (OUTPATIENT)
Dept: SPEECH THERAPY | Facility: CLINIC | Age: 65
End: 2025-06-25
Payer: COMMERCIAL

## 2025-06-25 DIAGNOSIS — R49.0 HOARSENESS: ICD-10-CM

## 2025-06-25 DIAGNOSIS — J38.02 BILATERAL VOCAL CORD PARESIS: Primary | ICD-10-CM

## 2025-06-25 PROCEDURE — 92507 TX SP LANG VOICE COMM INDIV: CPT | Mod: GN,95

## 2025-06-25 ASSESSMENT — PAIN SCALES - GENERAL: PAINLEVEL_OUTOF10: 0 - NO PAIN

## 2025-06-25 ASSESSMENT — PAIN - FUNCTIONAL ASSESSMENT: PAIN_FUNCTIONAL_ASSESSMENT: 0-10

## 2025-06-25 NOTE — PROGRESS NOTES
"Speech-Language Pathology    [facility] Adult Outpatient Speech-Language Pathology Treatment     Patient Name: Britt Whitley \"Saad"  MRN: [id number]  Today's Date: 6/25/2025     Time Calculation  Start Time: 1300  Stop Time: 1333  Time Calculation (min): 33 min      Current Problem:   1. Bilateral vocal cord paresis        2. Hoarseness          Pain Assessment:  Pain Assessment  Pain Assessment: 0-10  0-10 (Numeric) Pain Score: 0 - No pain    SUBJECTIVE  Patient alert and oriented and ready to participate in telehealth visit this date.    Virtual or Telephone Consent  An interactive audio and video telecommunication system which permits real time communications between the patient (at the originating site) and provider (at the distant site) was utilized to provide this telehealth service.   Verbal consent was requested and obtained from Britt Whitley on this date, 06/25/25 for a telehealth visit and the patient's location was confirmed at the time of the visit.      OBJECTIVE  LONG TERM GOALS (6/10/25 to 9/9/25)  Improve overall vocal health to foster increased participation levels at home, work and in the community environment.  Patient will demonstrate reduction in symptoms as evidenced by improved scores on VHI-10, RSI, and/or CSI following treatment.     SHORT TERM GOALS (6/10/25 to 9/9/25)  Patient will increase vocal wellness and decrease phono trauma in adherence with clinician prescribed vocal hygiene and wellness program per patient report 80% of his/her day.  Patient will increase ability to produce voice without tension within 5 minute conversational task x 80% accuracy as judged by clinician observation and/or patient report.  Patient will demonstrate independent use of voice techniques x 80% accuracy.  Patient will increase the balance of the respiratory/laryngeal musculature x 80% accuracy.    ASSESSMENT  Patient reports practicing RVT steps 1-4 several times a day as directed. She has noticed some " improvement related to her ability to produce a louder, smoother voice. She notes that if she sits upright and takes a substantial breath her quality is more consistent. Others have commented as well.    Monitored performance with RVT audible gargle, cup bubbles, tongue trilling and humming with a lingual anchor. Despite cueing, trilling remained mildly challenging. Advanced training based on progress to humming a sustained noted with an open throat posture (x 3), increased gliding hum with lingual anchor (x 3), chanting and saying [m] + vowels (x 12) and chanting/saying [m] loaded sentences. Multimodality cueing, instruction in proper posture and repeated trials improved patient performance accuracy. Encouraged home practice several times a day for limited duration. Additional steps emailed to facilitate optimal home carryover.    Continue CTT training as well targeting increased breath support at the phrase level. Conversational phrases emailed for daily home practice. Patient encouraged to transfer to conversation at least once a day.    PLAN  Continue current plan of care  Progress with POC, as tolerated  Discussed POC with patient  Patient/caregiver agreeable with POC

## 2025-06-26 ENCOUNTER — HOSPITAL ENCOUNTER (OUTPATIENT)
Dept: RADIOLOGY | Facility: HOSPITAL | Age: 65
Discharge: HOME | End: 2025-06-26
Payer: COMMERCIAL

## 2025-06-26 ENCOUNTER — APPOINTMENT (OUTPATIENT)
Dept: UROLOGY | Facility: CLINIC | Age: 65
End: 2025-06-26
Payer: COMMERCIAL

## 2025-06-26 DIAGNOSIS — N20.1 URETEROLITHIASIS: ICD-10-CM

## 2025-06-26 DIAGNOSIS — N20.0 KIDNEY STONES: ICD-10-CM

## 2025-06-26 DIAGNOSIS — N20.0 KIDNEY STONES: Primary | ICD-10-CM

## 2025-06-26 PROCEDURE — 1126F AMNT PAIN NOTED NONE PRSNT: CPT | Performed by: SURGERY

## 2025-06-26 PROCEDURE — 1159F MED LIST DOCD IN RCRD: CPT | Performed by: SURGERY

## 2025-06-26 PROCEDURE — 99203 OFFICE O/P NEW LOW 30 MIN: CPT | Performed by: SURGERY

## 2025-06-26 PROCEDURE — 74018 RADEX ABDOMEN 1 VIEW: CPT | Performed by: RADIOLOGY

## 2025-06-26 PROCEDURE — 74018 RADEX ABDOMEN 1 VIEW: CPT

## 2025-06-26 PROCEDURE — 1036F TOBACCO NON-USER: CPT | Performed by: SURGERY

## 2025-06-26 ASSESSMENT — PAIN SCALES - GENERAL: PAINLEVEL_OUTOF10: 0-NO PAIN

## 2025-07-09 ENCOUNTER — TELEMEDICINE CLINICAL SUPPORT (OUTPATIENT)
Dept: SPEECH THERAPY | Facility: CLINIC | Age: 65
End: 2025-07-09
Payer: COMMERCIAL

## 2025-07-09 DIAGNOSIS — R49.0 HOARSENESS: ICD-10-CM

## 2025-07-09 DIAGNOSIS — J38.02 BILATERAL VOCAL CORD PARESIS: Primary | ICD-10-CM

## 2025-07-09 PROCEDURE — 92507 TX SP LANG VOICE COMM INDIV: CPT | Mod: GN,95

## 2025-07-09 ASSESSMENT — PAIN - FUNCTIONAL ASSESSMENT: PAIN_FUNCTIONAL_ASSESSMENT: 0-10

## 2025-07-09 ASSESSMENT — PAIN SCALES - GENERAL: PAINLEVEL_OUTOF10: 0 - NO PAIN

## 2025-07-09 NOTE — PROGRESS NOTES
"Speech-Language Pathology    [facility] Adult Outpatient Speech-Language Pathology Treatment     Patient Name: Britt Whitley \"Saad"  MRN: [id number]  Today's Date: 7/9/2025     Time Calculation  Start Time: 1345  Stop Time: 1405  Time Calculation (min): 20 min      Current Problem:   1. Bilateral vocal cord paresis        2. Hoarseness          Pain Assessment:  Pain Assessment  Pain Assessment: 0-10  0-10 (Numeric) Pain Score: 0 - No pain    SUBJECTIVE  Patient alert and oriented and ready to participate in telehealth visit this date.     Virtual or Telephone Consent  An interactive audio and video telecommunication system which permits real time communications between the patient (at the originating site) and provider (at the distant site) was utilized to provide this telehealth service.   Verbal consent was requested and obtained from Britt Whitley on this date, 07/09/25 for a telehealth visit and the patient's location was confirmed at the time of the visit.        OBJECTIVE  LONG TERM GOALS (6/10/25 to 9/9/25)  Improve overall vocal health to foster increased participation levels at home, work and in the community environment.  Patient will demonstrate reduction in symptoms as evidenced by improved scores on VHI-10, RSI, and/or CSI following treatment.     SHORT TERM GOALS (6/10/25 to 9/9/25)  Patient will increase vocal wellness and decrease phono trauma in adherence with clinician prescribed vocal hygiene and wellness program per patient report 80% of his/her day.   GOAL STATUS: PROGRESSING  Patient will increase ability to produce voice without tension within 5 minute conversational task x 80% accuracy as judged by clinician observation and/or patient report.   GOAL STATUS: PROGRESSING  Patient will demonstrate independent use of voice techniques x 80% accuracy.   GOAL STATUS: PROGRESSING  Patient will increase the balance of the respiratory/laryngeal musculature x 80% accuracy.   GOAL STATUS: " PROGRESSING     ASSESSMENT  Patient reports continued good compliance with HEP including practicing RVT (all steps) several times a day as directed and CTT training targeting increased breath support with speech. She notes significant improvements but persistent vocal fatigue on days she has increased voicing demands at work . Others have also commented that she sounds better.      Monitored performance with RVT cup bubbles (x 6) , tongue trilling (x 6), humming with an open throat posture (x 4), chanting and saying [m] + vowels (x 4), reciting initial [m] words (x 20) and [m] loaded sentences (x 5). Min cueing required to improve patient performance accuracy.      Monitored CTT training as well targeting increased breath support at the phrase level with provided conversational phrases. Excellent carryover without needed feedback. Instructed in carryover activities including paragraph reading, monologues and dedicated conversations to increase habituation.     Session ended slightly short 2/2 epistaxis. Patient is scheduled for a cauterization with her ENT in the next few weeks.      PLAN  Continue current plan of care  Progress with POC, as tolerated  Discussed POC with patient  Patient/caregiver agreeable with POC

## 2025-07-15 ENCOUNTER — APPOINTMENT (OUTPATIENT)
Dept: OTOLARYNGOLOGY | Facility: CLINIC | Age: 65
End: 2025-07-15
Payer: COMMERCIAL

## 2025-07-15 VITALS — HEIGHT: 68 IN | BODY MASS INDEX: 39.56 KG/M2 | WEIGHT: 261 LBS

## 2025-07-15 DIAGNOSIS — R49.0 HOARSENESS: Primary | ICD-10-CM

## 2025-07-15 DIAGNOSIS — R04.0 EPISTAXIS: ICD-10-CM

## 2025-07-15 PROCEDURE — 99213 OFFICE O/P EST LOW 20 MIN: CPT | Performed by: OTOLARYNGOLOGY

## 2025-07-15 PROCEDURE — 3008F BODY MASS INDEX DOCD: CPT | Performed by: OTOLARYNGOLOGY

## 2025-07-15 PROCEDURE — 1160F RVW MEDS BY RX/DR IN RCRD: CPT | Performed by: OTOLARYNGOLOGY

## 2025-07-15 PROCEDURE — 31231 NASAL ENDOSCOPY DX: CPT | Performed by: OTOLARYNGOLOGY

## 2025-07-15 PROCEDURE — 1159F MED LIST DOCD IN RCRD: CPT | Performed by: OTOLARYNGOLOGY

## 2025-07-15 NOTE — PROGRESS NOTES
"Otolaryngology - Head & Neck Surgery  Subsequent Encounter Clinic Note  Name: Britt Whitlye  MRN: [id number]  : 1960  CC: Hoarseness and epistaxis      Interval History  Patient was most recently seen by ENT department on 25. No other complaints or pertinent history at this time.    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 She is working with SLP and states that this helping her symptoms of vocal fatigue. Is currently not interested in vocal cord injections and is happy with how her voice is progressing.    States epistaxis is worsening. Has had increasing frequency, duration, and intensity of episodes. Bleeding always comes from right naris.     Review of Systems  A complete 14 point review of systems was obtained and was negative other than what is stated in the history of present illness.    Past Medical History  Medical History[1]    Past Surgical History  Surgical History[2]    Medications  Medications Ordered Prior to Encounter[3]    Allergies  Allergies[4]    Social History  Social History[5]    Family History  Family History[6]    Physical Examination  Vitals: Ht 1.727 m (5' 8\")   Wt 118 kg (261 lb)   BMI 39.68 kg/m²   GENERAL: Appears well developed, well nourished  RESPIRATION: Breathing comfortably on room air, no stridor  EYES: EOM Intact, sclera normal  NEURO: Alert and conversive  HEAD AND FACE: Skin with no masses or lesions, sinuses nontender to palpation  SALIVARY GLANDS: Parotid and submandibular glands normal bilaterally  EARS: Normal external ears, external auditory canals, and TMs to otoscopy, normal hearing to voice  NOSE: External nose midline, anterior rhinoscopy reveals 3 areas of anterior septal bleeding  ORAL CAVITY/OROPHARYNX/LIPS: Normal mucous membranes, normal floor of mouth/tongue/OP, no masses or lesions are noted  PHARYNGEAL WALLS AND NASOPHARYNX: No masses noted  NECK/LYMPH: No LAD, no thyroid masses    Anterior rhinoscopy and " cauterization:  Anterior rhinoscopy revealed septal vessels which  bled when palpating the septum. This was managed with silver nitrate cautery. The septum was subsequently hemostatic. One surgicel was placed.    Impression  Britt Whitley is a 64 y.o. female who presents to ENT clinic today for epistaxis (primarily) and hoarseness due to presbylarynges. Hoarseness improving with SLP. Epistaxis managed with AgNO3 cautery.    Plan  - Follow-up in August 2025 or with any worsening symptoms of epistaxis  - Continue conservative management of epistaxis including ayr gel, humidification    Seen and discussed with Dr. Jocelyn Stoddard DO PGY-1  ENT    The above resident note has been reviewed and confirmed.  Patient was seen and examined with the resident today.  Documentation has been reviewed.        Nasal endoscopy:  After informed verbal consent nasal cavity was anesthetized with lidocaine Laith-Synephrine mixture after suitable time had passed flexible nasal endoscopy carried out showing crisp nasal mucosa no lesions no obstructive processes middle and inferior turbinates intact nasal choanae patent septum intact scope removed patient tolerated procedure well            [1]   Past Medical History:  Diagnosis Date    Abnormal Pap smear of cervix 1995    Breast cyst [id number]    Diabetes mellitus (Multi) 2008    Disease of thyroid gland 2008    Hypertension 2000    Kidney stone 2023    Nosebleed April 2025    Obesity 1975    Pure hypercholesterolemia, unspecified 11/22/2013    Low-density-lipoid-type (LDL) hyperlipoproteinemia    Urinary tract infection Annually    Voice disorder 2025   [2]   Past Surgical History:  Procedure Laterality Date    BREAST BIOPSY  [id number]    BREAST LUMPECTOMY  [id number]    HYSTERECTOMY  [id number]    TONSILLECTOMY  1963   [3]   Current Outpatient Medications on File Prior to Visit   Medication Sig Dispense Refill    atorvastatin (Lipitor) 10 mg tablet TAKE 1 TABLET DAILY 90 tablet 3     cholecalciferol (Vitamin D-3) 5,000 Units tablet Take 1 tablet (5,000 Units) by mouth once daily. 90 tablet 0    losartan (Cozaar) 50 mg tablet TAKE ONE TABLET BY MOUTH EVERY DAY 90 tablet 0    metFORMIN (Glucophage) 1,000 mg tablet TAKE ONE TABLET BY MOUTH TWICE A DAY , IN MORNING AND LATE AFTERNOON 180 tablet 0    ondansetron ODT (Zofran-ODT) 4 mg disintegrating tablet Dissolve 1 tablet (4 mg) in the mouth every 8 hours if needed for nausea or vomiting for up to 20 doses. 20 tablet 0    Synthroid 50 mcg tablet TAKE ONE TABLET BY MOUTH EARLY IN THE MORNING 90 tablet 0    tirzepatide (Mounjaro) 5 mg/0.5 mL pen injector Inject 5 mg under the skin 1 (one) time per week. 6 mL 1    [DISCONTINUED] levothyroxine (Synthroid, Levoxyl) 50 mcg tablet Take 1 tablet (50 mcg) by mouth early in the morning.. 90 tablet 1     No current facility-administered medications on file prior to visit.   [4] No Known Allergies  [5]   Social History  Tobacco Use    Smoking status: Former     Current packs/day: 0.00     Average packs/day: 0.5 packs/day for 7.0 years (3.5 ttl pk-yrs)     Types: Cigarettes     Start date: 1985     Quit date: 1992     Years since quittin.5    Smokeless tobacco: Never   Substance Use Topics    Alcohol use: Not Currently     Comment: Fewer than ten drinks per year    Drug use: Never   [6]   Family History  Problem Relation Name Age of Onset    Diabetes Other Family History     Heart disease Other Family History     Cancer Maternal Grandmother Viridiana Apple     Cancer Paternal Grandfather Suraj Key Sr     Hypertension Father Esvin Key     Heart disease Father Esvin Key     Heart disease Paternal Grandmother Anabell Shahid     Diabetes Mother Esvin Shahid     Kidney cancer Father's Brother Suraj Key Jr     Hypertension Mother Zohra Key     Arthritis Mother Zohra Key     Cancer Maternal Grandmother Viridiana Apple     Arthritis Maternal Grandmother Viridiana Apple     Cancer Paternal Grandfather  Suraj Key Sr     Diabetes Father Esvin Key     Hypertension Father Esvin Key     Heart disease Father Esvin Key     Hypertension Brother Alfonso Key     Heart disease Paternal Grandmother Anabell Key     Hypertension Mother Zohra Key     Arthritis Mother Zohra Key     Cancer Maternal Grandmother Independence Apple     Arthritis Maternal Grandmother Viridiana Apple     Cancer Paternal Grandfather Suraj Key Sr     Hypertension Father Esvin Key     Heart disease Father Esvin Key     Diabetes Brother Alfonso Key     Hypertension Brother Alfonso Key     Heart disease Paternal Grandmother Anabell Key     Arthritis Maternal Grandmother Viridiana Apple     Diabetes Father Esvin Key     Hypertension Mother Esvin Key     Arthritis Mother Esvin Key

## 2025-07-23 ENCOUNTER — TELEMEDICINE CLINICAL SUPPORT (OUTPATIENT)
Dept: SPEECH THERAPY | Facility: CLINIC | Age: 65
End: 2025-07-23
Payer: COMMERCIAL

## 2025-07-23 DIAGNOSIS — J38.02 BILATERAL VOCAL CORD PARESIS: Primary | ICD-10-CM

## 2025-07-23 DIAGNOSIS — R49.0 HOARSENESS: ICD-10-CM

## 2025-07-23 PROCEDURE — 92507 TX SP LANG VOICE COMM INDIV: CPT | Mod: GN,95

## 2025-07-23 ASSESSMENT — PAIN SCALES - GENERAL: PAINLEVEL_OUTOF10: 0 - NO PAIN

## 2025-07-23 ASSESSMENT — PAIN - FUNCTIONAL ASSESSMENT: PAIN_FUNCTIONAL_ASSESSMENT: 0-10

## 2025-07-24 NOTE — PROGRESS NOTES
"Speech-Language Pathology    [facility] Adult Outpatient Speech-Language Pathology Treatment     Patient Name: Britt Whitley \"Saad"  MRN: [id number]  Today's Date: 7/23/2025     Time Calculation  Start Time: 1345  Stop Time: 1422  Time Calculation (min): 37 min      Current Problem:   1. Bilateral vocal cord paresis        2. Hoarseness          Pain Assessment:  Pain Assessment  Pain Assessment: 0-10  0-10 (Numeric) Pain Score: 0 - No pain    SUBJECTIVE  Patient alert and oriented and ready to participate in telehealth visit this date.     Virtual or Telephone Consent  An interactive audio and video telecommunication system which permits real time communications between the patient (at the originating site) and provider (at the distant site) was utilized to provide this telehealth service.   Verbal consent was requested and obtained from Britt Whitley on this date, 07/23/25 for a telehealth visit and the patient's location was confirmed at the time of the visit.        OBJECTIVE  LONG TERM GOALS (6/10/25 to 9/9/25)  Improve overall vocal health to foster increased participation levels at home, work and in the community environment.  Patient will demonstrate reduction in symptoms as evidenced by improved scores on VHI-10, RSI, and/or CSI following treatment.     SHORT TERM GOALS (6/10/25 to 9/9/25)  Patient will increase vocal wellness and decrease phono trauma in adherence with clinician prescribed vocal hygiene and wellness program per patient report 80% of his/her day.              GOAL STATUS: GOAL MET  Patient will increase ability to produce voice without tension within 5 minute conversational task x 80% accuracy as judged by clinician observation and/or patient report.              GOAL STATUS: PROGRESSING  Patient will demonstrate independent use of voice techniques x 80% accuracy.              GOAL STATUS: PROGRESSING  Patient will increase the balance of the respiratory/laryngeal musculature x 80% " accuracy.              GOAL STATUS: PROGRESSING     ASSESSMENT  Patient was seen by ENT since her last visit for a cauterization with success. She states she is still sore but doing better.    Patient reports continued good compliance with HEP including practicing RVT (all steps) several times a day as directed and CTT training targeting increased breath support with speech. She notes continued improvements with RVT and CTT training with reading tasks > spontaneous speech tasks. Continued positive comments from colleagues and family/friends.      Shifted to flow phonation this date from RVT to reduce risk of epistasis from forward placed voicing. Instructed in all steps x 3-5 with excellent patient performance accuracy following multimodality cueing, instruction in proper posture and repeated trials. Encouraged home practice several times a day for limited duration.     Monitored CTT training as well targeting increased breath support at the phrase level and with spontaneous speech while answering situational questions. Excellent carryover with min feedback. Reviewed carryover activities including paragraph reading, monologues and dedicated conversations to increase habituation.      Clinician to see patient with referring provider during her clinic follow up visit next week.    PLAN  Continue current plan of care  Progress with POC, as tolerated  Discussed POC with patient  Patient/caregiver agreeable with POC

## 2025-07-29 ENCOUNTER — APPOINTMENT (OUTPATIENT)
Dept: OTOLARYNGOLOGY | Facility: CLINIC | Age: 65
End: 2025-07-29
Payer: COMMERCIAL

## 2025-07-29 VITALS — BODY MASS INDEX: 38.04 KG/M2 | HEIGHT: 68 IN | WEIGHT: 251 LBS

## 2025-07-29 DIAGNOSIS — R49.0 DYSPHONIA: Primary | ICD-10-CM

## 2025-07-29 DIAGNOSIS — J38.02 BILATERAL VOCAL CORD PARESIS: ICD-10-CM

## 2025-07-29 PROCEDURE — 99212 OFFICE O/P EST SF 10 MIN: CPT

## 2025-07-29 PROCEDURE — 1159F MED LIST DOCD IN RCRD: CPT

## 2025-07-29 PROCEDURE — 1160F RVW MEDS BY RX/DR IN RCRD: CPT

## 2025-07-29 PROCEDURE — 3008F BODY MASS INDEX DOCD: CPT

## 2025-07-29 NOTE — PROGRESS NOTES
"Chief Complaint  Chief Complaint   Patient presents with    Follow-up        Pertinent History:  Seen 5/29/25: Britt Whitley \"Isabel\", who is a 64 y.o. female presenting for an initial visit for hoarseness of voice.  The patient reports that the hoarseness has been occurring over the past year and has worsened over the past few months. The patient reports high voice demand through work and will experience vocal fatigue and worsening of hoarseness with increased talking. Patient reports hx of acid reflux, but has not experienced reflux recently with weight loss. Patient reports breathing is stable. Patient denies any issues with swallowing solids, liquids, and pills.       Interval History  Since last visit patient reports good improvement in voice quality after working with Carla SLP. Patient reports that she has also had improvement in projection. Patient reports breathing is stable. Patient reports not experiencing any symptoms of acid reflux. Able to tolerate solids, liquids, and pills when swallowing.     Exam:  VOICE: mild hoarseness  RESPIRATION: Breathing comfortably, no stridor.    ORAL CAVITY/OROPHARYNX/LIPS: Normal mucous membranes, normal floor of mouth/tongue/OP, no masses or lesions are noted.    SKIN: Neck skin is without scar or injury.    PSYCH: Alert and oriented with appropriate mood and affect.       Assessment and Plan:  This is a follow up visit for dysphonia with improved voice quality. Discussed touching base with carla after healed from procedure through nose. Did not scope today due to still healing from procedure. Patient can schedule an additional follow up visit with Carla SLP for maintenance of the voice moving forward. Patient can Follow up as needed. All questions answered.         "

## 2025-08-19 ENCOUNTER — APPOINTMENT (OUTPATIENT)
Dept: OTOLARYNGOLOGY | Facility: CLINIC | Age: 65
End: 2025-08-19
Payer: COMMERCIAL

## 2025-08-19 DIAGNOSIS — J31.0 CHRONIC RHINITIS: ICD-10-CM

## 2025-08-19 DIAGNOSIS — R04.0 EPISTAXIS: Primary | ICD-10-CM

## 2025-08-19 PROCEDURE — 99213 OFFICE O/P EST LOW 20 MIN: CPT | Performed by: OTOLARYNGOLOGY

## 2025-08-19 PROCEDURE — 1160F RVW MEDS BY RX/DR IN RCRD: CPT | Performed by: OTOLARYNGOLOGY

## 2025-08-19 PROCEDURE — 1159F MED LIST DOCD IN RCRD: CPT | Performed by: OTOLARYNGOLOGY

## 2025-08-19 ASSESSMENT — PATIENT HEALTH QUESTIONNAIRE - PHQ9
2. FEELING DOWN, DEPRESSED OR HOPELESS: NOT AT ALL
SUM OF ALL RESPONSES TO PHQ9 QUESTIONS 1 AND 2: 0
1. LITTLE INTEREST OR PLEASURE IN DOING THINGS: NOT AT ALL

## 2025-08-28 ASSESSMENT — PROMIS GLOBAL HEALTH SCALE
RATE_GENERAL_HEALTH: GOOD
RATE_QUALITY_OF_LIFE: EXCELLENT
CARRYOUT_PHYSICAL_ACTIVITIES: MOSTLY
CARRYOUT_SOCIAL_ACTIVITIES: EXCELLENT
RATE_MENTAL_HEALTH: EXCELLENT
RATE_AVERAGE_PAIN: 2
RATE_PHYSICAL_HEALTH: GOOD
RATE_AVERAGE_FATIGUE: MODERATE
EMOTIONAL_PROBLEMS: NEVER
RATE_SOCIAL_SATISFACTION: VERY GOOD

## 2025-08-29 LAB
ALBUMIN SERPL-MCNC: 4.7 G/DL (ref 3.6–5.1)
ALP SERPL-CCNC: 62 U/L (ref 37–153)
ALT SERPL-CCNC: 15 U/L (ref 6–29)
ANION GAP SERPL CALCULATED.4IONS-SCNC: 12 MMOL/L (CALC) (ref 7–17)
AST SERPL-CCNC: 16 U/L (ref 10–35)
BILIRUB SERPL-MCNC: 1 MG/DL (ref 0.2–1.2)
BUN SERPL-MCNC: 19 MG/DL (ref 7–25)
CALCIUM SERPL-MCNC: 9.9 MG/DL (ref 8.6–10.4)
CHLORIDE SERPL-SCNC: 104 MMOL/L (ref 98–110)
CHOLEST SERPL-MCNC: 114 MG/DL
CHOLEST/HDLC SERPL: 2.3 (CALC)
CO2 SERPL-SCNC: 24 MMOL/L (ref 20–32)
CREAT SERPL-MCNC: 1.14 MG/DL (ref 0.5–1.05)
EGFRCR SERPLBLD CKD-EPI 2021: 53 ML/MIN/1.73M2
EST. AVERAGE GLUCOSE BLD GHB EST-MCNC: 103 MG/DL
EST. AVERAGE GLUCOSE BLD GHB EST-SCNC: 5.7 MMOL/L
GLUCOSE SERPL-MCNC: 64 MG/DL (ref 65–99)
HBA1C MFR BLD: 5.2 %
HDLC SERPL-MCNC: 49 MG/DL
LDLC SERPL CALC-MCNC: 45 MG/DL (CALC)
NONHDLC SERPL-MCNC: 65 MG/DL (CALC)
POTASSIUM SERPL-SCNC: 4.9 MMOL/L (ref 3.5–5.3)
PROT SERPL-MCNC: 7.2 G/DL (ref 6.1–8.1)
PTH-INTACT SERPL-MCNC: 22 PG/ML (ref 16–77)
SODIUM SERPL-SCNC: 140 MMOL/L (ref 135–146)
TRIGL SERPL-MCNC: 121 MG/DL
TSH SERPL-ACNC: 1.76 MIU/L (ref 0.4–4.5)

## 2025-09-04 ENCOUNTER — APPOINTMENT (OUTPATIENT)
Dept: PRIMARY CARE | Facility: CLINIC | Age: 65
End: 2025-09-04
Payer: COMMERCIAL

## 2025-09-04 PROCEDURE — RXMED WILLOW AMBULATORY MEDICATION CHARGE

## 2025-09-04 ASSESSMENT — PATIENT HEALTH QUESTIONNAIRE - PHQ9
1. LITTLE INTEREST OR PLEASURE IN DOING THINGS: NOT AT ALL
2. FEELING DOWN, DEPRESSED OR HOPELESS: NOT AT ALL
SUM OF ALL RESPONSES TO PHQ9 QUESTIONS 1 AND 2: 0

## 2025-09-05 ENCOUNTER — PHARMACY VISIT (OUTPATIENT)
Dept: PHARMACY | Facility: CLINIC | Age: 65
End: 2025-09-05
Payer: MEDICARE

## 2025-12-30 ENCOUNTER — APPOINTMENT (OUTPATIENT)
Dept: UROLOGY | Facility: CLINIC | Age: 65
End: 2025-12-30
Payer: COMMERCIAL